# Patient Record
Sex: MALE | Race: WHITE | NOT HISPANIC OR LATINO | Employment: UNEMPLOYED | URBAN - NONMETROPOLITAN AREA
[De-identification: names, ages, dates, MRNs, and addresses within clinical notes are randomized per-mention and may not be internally consistent; named-entity substitution may affect disease eponyms.]

---

## 2021-09-14 ENCOUNTER — LAB (OUTPATIENT)
Dept: LAB | Facility: HOSPITAL | Age: 21
End: 2021-09-14

## 2021-09-14 ENCOUNTER — OFFICE VISIT (OUTPATIENT)
Dept: INTERNAL MEDICINE | Facility: CLINIC | Age: 21
End: 2021-09-14

## 2021-09-14 VITALS
SYSTOLIC BLOOD PRESSURE: 122 MMHG | BODY MASS INDEX: 24.09 KG/M2 | DIASTOLIC BLOOD PRESSURE: 70 MMHG | WEIGHT: 172.1 LBS | TEMPERATURE: 98.3 F | HEART RATE: 78 BPM | RESPIRATION RATE: 14 BRPM | OXYGEN SATURATION: 99 % | HEIGHT: 71 IN

## 2021-09-14 DIAGNOSIS — K90.0 CELIAC DISEASE: ICD-10-CM

## 2021-09-14 DIAGNOSIS — R11.2 NON-INTRACTABLE VOMITING WITH NAUSEA, UNSPECIFIED VOMITING TYPE: ICD-10-CM

## 2021-09-14 DIAGNOSIS — R53.83 FATIGUE, UNSPECIFIED TYPE: ICD-10-CM

## 2021-09-14 DIAGNOSIS — K21.9 GASTROESOPHAGEAL REFLUX DISEASE, UNSPECIFIED WHETHER ESOPHAGITIS PRESENT: ICD-10-CM

## 2021-09-14 DIAGNOSIS — R11.2 NON-INTRACTABLE VOMITING WITH NAUSEA, UNSPECIFIED VOMITING TYPE: Primary | ICD-10-CM

## 2021-09-14 DIAGNOSIS — R10.11 RUQ PAIN: ICD-10-CM

## 2021-09-14 LAB
ALBUMIN SERPL-MCNC: 5.1 G/DL (ref 3.5–5.2)
ALBUMIN/GLOB SERPL: 1.9 G/DL
ALP SERPL-CCNC: 56 U/L (ref 39–117)
ALT SERPL W P-5'-P-CCNC: 13 U/L (ref 1–41)
ANION GAP SERPL CALCULATED.3IONS-SCNC: 10 MMOL/L (ref 5–15)
AST SERPL-CCNC: 20 U/L (ref 1–40)
BASOPHILS # BLD AUTO: 0.02 10*3/MM3 (ref 0–0.2)
BASOPHILS NFR BLD AUTO: 0.3 % (ref 0–1.5)
BILIRUB SERPL-MCNC: 0.7 MG/DL (ref 0–1.2)
BUN SERPL-MCNC: 15 MG/DL (ref 6–20)
BUN/CREAT SERPL: 17 (ref 7–25)
CALCIUM SPEC-SCNC: 9.8 MG/DL (ref 8.6–10.5)
CHLORIDE SERPL-SCNC: 102 MMOL/L (ref 98–107)
CO2 SERPL-SCNC: 28 MMOL/L (ref 22–29)
CREAT SERPL-MCNC: 0.88 MG/DL (ref 0.76–1.27)
DEPRECATED RDW RBC AUTO: 40.6 FL (ref 37–54)
EOSINOPHIL # BLD AUTO: 0.09 10*3/MM3 (ref 0–0.4)
EOSINOPHIL NFR BLD AUTO: 1.3 % (ref 0.3–6.2)
ERYTHROCYTE [DISTWIDTH] IN BLOOD BY AUTOMATED COUNT: 12 % (ref 12.3–15.4)
GFR SERPL CREATININE-BSD FRML MDRD: 109 ML/MIN/1.73
GLOBULIN UR ELPH-MCNC: 2.7 GM/DL
GLUCOSE SERPL-MCNC: 96 MG/DL (ref 65–99)
HCT VFR BLD AUTO: 46.4 % (ref 37.5–51)
HGB BLD-MCNC: 15.6 G/DL (ref 13–17.7)
IMM GRANULOCYTES # BLD AUTO: 0.02 10*3/MM3 (ref 0–0.05)
IMM GRANULOCYTES NFR BLD AUTO: 0.3 % (ref 0–0.5)
LYMPHOCYTES # BLD AUTO: 2.35 10*3/MM3 (ref 0.7–3.1)
LYMPHOCYTES NFR BLD AUTO: 33.9 % (ref 19.6–45.3)
MCH RBC QN AUTO: 31 PG (ref 26.6–33)
MCHC RBC AUTO-ENTMCNC: 33.6 G/DL (ref 31.5–35.7)
MCV RBC AUTO: 92.1 FL (ref 79–97)
MONOCYTES # BLD AUTO: 0.54 10*3/MM3 (ref 0.1–0.9)
MONOCYTES NFR BLD AUTO: 7.8 % (ref 5–12)
NEUTROPHILS NFR BLD AUTO: 3.91 10*3/MM3 (ref 1.7–7)
NEUTROPHILS NFR BLD AUTO: 56.4 % (ref 42.7–76)
NRBC BLD AUTO-RTO: 0 /100 WBC (ref 0–0.2)
PLATELET # BLD AUTO: 192 10*3/MM3 (ref 140–450)
PMV BLD AUTO: 10.7 FL (ref 6–12)
POTASSIUM SERPL-SCNC: 4 MMOL/L (ref 3.5–5.2)
PROT SERPL-MCNC: 7.8 G/DL (ref 6–8.5)
RBC # BLD AUTO: 5.04 10*6/MM3 (ref 4.14–5.8)
SODIUM SERPL-SCNC: 140 MMOL/L (ref 136–145)
WBC # BLD AUTO: 6.93 10*3/MM3 (ref 3.4–10.8)

## 2021-09-14 PROCEDURE — 80050 GENERAL HEALTH PANEL: CPT

## 2021-09-14 PROCEDURE — 80061 LIPID PANEL: CPT

## 2021-09-14 PROCEDURE — 99204 OFFICE O/P NEW MOD 45 MIN: CPT | Performed by: INTERNAL MEDICINE

## 2021-09-14 PROCEDURE — 83690 ASSAY OF LIPASE: CPT

## 2021-09-14 PROCEDURE — 36415 COLL VENOUS BLD VENIPUNCTURE: CPT

## 2021-09-14 PROCEDURE — 83036 HEMOGLOBIN GLYCOSYLATED A1C: CPT

## 2021-09-14 RX ORDER — PROPRANOLOL HYDROCHLORIDE 10 MG/1
10 TABLET ORAL 3 TIMES DAILY
COMMUNITY
End: 2021-09-14

## 2021-09-14 RX ORDER — TRAZODONE HYDROCHLORIDE 50 MG/1
50 TABLET ORAL AS NEEDED
COMMUNITY
End: 2021-09-14

## 2021-09-14 RX ORDER — PANTOPRAZOLE SODIUM 40 MG/1
40 TABLET, DELAYED RELEASE ORAL DAILY
Qty: 30 TABLET | Refills: 2 | Status: SHIPPED | OUTPATIENT
Start: 2021-09-14 | End: 2022-05-12

## 2021-09-14 RX ORDER — RISPERIDONE 1 MG/1
1 TABLET ORAL 3 TIMES DAILY
COMMUNITY
End: 2021-09-14

## 2021-09-14 RX ORDER — ONDANSETRON 4 MG/1
4 TABLET, FILM COATED ORAL EVERY 8 HOURS PRN
Qty: 60 TABLET | Refills: 1 | Status: SHIPPED | OUTPATIENT
Start: 2021-09-14 | End: 2022-05-12

## 2021-09-14 NOTE — PROGRESS NOTES
Chief Complaint   Patient presents with   • Establish Care         History:  Krishna Jean-Baptiste is a 20 y.o. male who presents today for evaluation of the above problems.      He presents today to establish care.  He recently got insurance and he was having some issues.  He has known celiac disease diagnosed 5 years ago.  He is adhering to a strict gluten-free diet.  For the last 5 years he has been having issues with nausea vomiting abdominal pain.  And also feels like there is an air pocket in his stomach.  At times, pain and nausea in the morning will wake him up.  He has vomited but denies any hematemesis, melena or hematochezia.  He does have diarrhea as well.  He does smoke marijuana daily and if the pain is worse in the morning he will smoking morning.  Typically he will smoke every evening as well.  He tried coming off of marijuana without any change in his symptoms.  Heart showers do not make the symptoms better.  He had decreased appetite but paradoxically has gained weight.  He is also fatigued.  Pain is mostly in the right upper quadrant retrosternal in his stomach when he feels like There is a gas bubble.    He has tried Pepcid which did not help.  He drinks water and no soft drinks or other drinks.    He runs and exercises daily which helps.    He has never been diagnosed with IBS.    His mother became sick while he was a child and was homeless for a time.  He has been homeless off and on since the age of 13.  He would stay at a friend's homes for 6 months to a year for as long as he could stay there.  In between staying at his friend's house he would be homeless.  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  He was diagnosed with schizophrenia, personality disorder and bipolar disorder.  He was on trazodone, propranolol and Risperdal.  However, these made him feel very  sick and he stopped taking them.  He feels like he was misdiagnosed and it is now previous diagnoses    He has not had the COVID-19 vaccine but is interested.    HPI    Social History     Socioeconomic History   • Marital status: Single     Spouse name: Not on file   • Number of children: Not on file   • Years of education: Not on file   • Highest education level: Not on file   Tobacco Use   • Smoking status: Former Smoker     Packs/day: 1.50     Years: 2.00     Pack years: 3.00     Types: Cigarettes, Cigars   • Smokeless tobacco: Never Used   Substance and Sexual Activity   • Alcohol use: Never   • Drug use: Yes     Types: Marijuana   • Sexual activity: Yes     Partners: Male     Birth control/protection: Condom       ROS:  Review of Systems   Constitutional: Positive for appetite change and fatigue. Negative for unexpected weight change.   HENT: Negative.    Respiratory: Negative for cough and shortness of breath.    Cardiovascular: Negative for chest pain, palpitations and leg swelling.   Gastrointestinal: Positive for abdominal distention, abdominal pain, diarrhea, nausea and vomiting. Negative for blood in stool and constipation.        Early satiety   Genitourinary: Negative.    Musculoskeletal: Negative.    Skin: Negative.          Current Outpatient Medications:   •  ondansetron (Zofran) 4 MG tablet, Take 1 tablet by mouth Every 8 (Eight) Hours As Needed for Nausea or Vomiting., Disp: 60 tablet, Rfl: 1  •  pantoprazole (PROTONIX) 40 MG EC tablet, Take 1 tablet by mouth Daily., Disp: 30 tablet, Rfl: 2    Lab Results   Component Value Date    GLUCOSE 96 09/14/2021    BUN 15 09/14/2021    CREATININE 0.88 09/14/2021    EGFRIFNONA 109 09/14/2021    BCR 17.0 09/14/2021    K 4.0 09/14/2021    CO2 28.0 09/14/2021    CALCIUM 9.8 09/14/2021    ALBUMIN 5.10 09/14/2021    AST 20 09/14/2021    ALT 13 09/14/2021       WBC   Date Value Ref Range Status   09/14/2021 6.93 3.40 - 10.80 10*3/mm3 Final     RBC   Date Value Ref  Range Status   09/14/2021 5.04 4.14 - 5.80 10*6/mm3 Final     Hemoglobin   Date Value Ref Range Status   09/14/2021 15.6 13.0 - 17.7 g/dL Final     Hematocrit   Date Value Ref Range Status   09/14/2021 46.4 37.5 - 51.0 % Final     MCV   Date Value Ref Range Status   09/14/2021 92.1 79.0 - 97.0 fL Final     MCH   Date Value Ref Range Status   09/14/2021 31.0 26.6 - 33.0 pg Final     MCHC   Date Value Ref Range Status   09/14/2021 33.6 31.5 - 35.7 g/dL Final     RDW   Date Value Ref Range Status   09/14/2021 12.0 (L) 12.3 - 15.4 % Final     RDW-SD   Date Value Ref Range Status   09/14/2021 40.6 37.0 - 54.0 fl Final     MPV   Date Value Ref Range Status   09/14/2021 10.7 6.0 - 12.0 fL Final     Platelets   Date Value Ref Range Status   09/14/2021 192 140 - 450 10*3/mm3 Final     Neutrophil %   Date Value Ref Range Status   09/14/2021 56.4 42.7 - 76.0 % Final     Lymphocyte %   Date Value Ref Range Status   09/14/2021 33.9 19.6 - 45.3 % Final     Monocyte %   Date Value Ref Range Status   09/14/2021 7.8 5.0 - 12.0 % Final     Eosinophil %   Date Value Ref Range Status   09/14/2021 1.3 0.3 - 6.2 % Final     Basophil %   Date Value Ref Range Status   09/14/2021 0.3 0.0 - 1.5 % Final     Immature Grans %   Date Value Ref Range Status   09/14/2021 0.3 0.0 - 0.5 % Final     Neutrophils, Absolute   Date Value Ref Range Status   09/14/2021 3.91 1.70 - 7.00 10*3/mm3 Final     Lymphocytes, Absolute   Date Value Ref Range Status   09/14/2021 2.35 0.70 - 3.10 10*3/mm3 Final     Monocytes, Absolute   Date Value Ref Range Status   09/14/2021 0.54 0.10 - 0.90 10*3/mm3 Final     Eosinophils, Absolute   Date Value Ref Range Status   09/14/2021 0.09 0.00 - 0.40 10*3/mm3 Final     Basophils, Absolute   Date Value Ref Range Status   09/14/2021 0.02 0.00 - 0.20 10*3/mm3 Final     Immature Grans, Absolute   Date Value Ref Range Status   09/14/2021 0.02 0.00 - 0.05 10*3/mm3 Final     nRBC   Date Value Ref Range Status   09/14/2021 0.0 0.0 -  "0.2 /100 WBC Final         OBJECTIVE:  Visit Vitals  /70 (BP Location: Left arm, Patient Position: Sitting, Cuff Size: Adult)   Pulse 78   Temp 98.3 °F (36.8 °C) (Oral)   Resp 14   Ht 180.3 cm (71\")   Wt 78.1 kg (172 lb 1.6 oz)   SpO2 99%   BMI 24.00 kg/m²      Physical Exam  Vitals reviewed.   Constitutional:       General: He is not in acute distress.     Appearance: Normal appearance. He is well-developed and normal weight.      Comments: Pleasant   HENT:      Head: Normocephalic and atraumatic.   Eyes:      Pupils: Pupils are equal, round, and reactive to light.   Neck:      Thyroid: No thyromegaly.      Trachea: Phonation normal.   Cardiovascular:      Rate and Rhythm: Normal rate and regular rhythm.      Heart sounds: No murmur heard.     Pulmonary:      Effort: Pulmonary effort is normal. No respiratory distress.      Breath sounds: Normal breath sounds. No wheezing or rales.   Abdominal:      General: Abdomen is flat. Bowel sounds are normal. There is no distension.      Palpations: Abdomen is soft. There is no mass.      Tenderness: There is generalized abdominal tenderness and tenderness in the right upper quadrant. There is no rebound. Negative signs include Leavitt's sign.   Skin:     Coloration: Skin is not pale.      Findings: No erythema.   Neurological:      Mental Status: He is alert.   Psychiatric:         Behavior: Behavior normal.         Thought Content: Thought content normal.         Judgment: Judgment normal.         Assessment/Plan    Diagnoses and all orders for this visit:    1. Non-intractable vomiting with nausea, unspecified vomiting type (Primary)  -     Lipase; Future    2. Gastroesophageal reflux disease, unspecified whether esophagitis present  -     pantoprazole (PROTONIX) 40 MG EC tablet; Take 1 tablet by mouth Daily.  Dispense: 30 tablet; Refill: 2    3. Celiac disease    4. Fatigue, unspecified type  -     pantoprazole (PROTONIX) 40 MG EC tablet; Take 1 tablet by mouth " Daily.  Dispense: 30 tablet; Refill: 2  -     Lipid Panel; Future  -     Hemoglobin A1c; Future  -     Comprehensive Metabolic Panel; Future  -     CBC & Differential; Future  -     TSH; Future    5. RUQ pain  -     US Abdomen Limited  -     Lipase; Future    Other orders  -     ondansetron (Zofran) 4 MG tablet; Take 1 tablet by mouth Every 8 (Eight) Hours As Needed for Nausea or Vomiting.  Dispense: 60 tablet; Refill: 1      At this point unclear etiology of his symptoms.  Differential includes cholelithiasis,, gallbladder dysfunction, esophagitis or gastritis versus gastric ulcer versus reflux disease versus celiac disease, IBS, cannabis hyperemesis syndrome among others.    Needs further work-up with the lab work above.  Also ordered a ultrasound of his abdomen.  He may require a HIDA scan depending on these results and symptoms.    I would like to see how his symptoms are improved with Protonix.    It may be worth a trial of Bentyl if symptoms continue or worsen.    He does not have any red flag symptoms, but does have early satiety.  He has not lost any weight.    If the above work-up is unremarkable he may need a referral to gastroenterology for consideration of EGD.    Follow-up 3 months for recheck or sooner if indicated.    Return in about 3 months (around 12/14/2021) for Recheck.    Patient was given instructions and counseling regarding his/her condition or for health maintenance advice. Please see specific information pulled into the AVS if appropriate.     GENOVEVA Cho MD  13:46 CDT  9/14/2021

## 2021-09-15 LAB
CHOLEST SERPL-MCNC: 180 MG/DL (ref 0–200)
HBA1C MFR BLD: 5.15 % (ref 4.8–5.6)
HDLC SERPL-MCNC: 51 MG/DL (ref 40–60)
LDLC SERPL CALC-MCNC: 116 MG/DL (ref 0–100)
LDLC/HDLC SERPL: 2.26 {RATIO}
LIPASE SERPL-CCNC: 19 U/L (ref 13–60)
TRIGL SERPL-MCNC: 68 MG/DL (ref 0–150)
TSH SERPL DL<=0.05 MIU/L-ACNC: 1.05 UIU/ML (ref 0.27–4.2)
VLDLC SERPL-MCNC: 13 MG/DL (ref 5–40)

## 2022-03-02 ENCOUNTER — HOSPITAL ENCOUNTER (EMERGENCY)
Facility: HOSPITAL | Age: 22
Discharge: HOME | End: 2022-03-03
Attending: EMERGENCY MEDICINE | Admitting: EMERGENCY MEDICINE

## 2022-03-02 ENCOUNTER — NURSE TRIAGE (OUTPATIENT)
Dept: CALL CENTER | Facility: HOSPITAL | Age: 22
End: 2022-03-02

## 2022-03-02 DIAGNOSIS — R10.10 PAIN OF UPPER ABDOMEN: Primary | ICD-10-CM

## 2022-03-02 DIAGNOSIS — R07.9 CHEST PAIN, UNSPECIFIED TYPE: ICD-10-CM

## 2022-03-02 PROCEDURE — P9612 CATHETERIZE FOR URINE SPEC: HCPCS

## 2022-03-02 PROCEDURE — 99283 EMERGENCY DEPT VISIT LOW MDM: CPT

## 2022-03-02 RX ORDER — SODIUM CHLORIDE 0.9 % (FLUSH) 0.9 %
10 SYRINGE (ML) INJECTION AS NEEDED
Status: DISCONTINUED | OUTPATIENT
Start: 2022-03-02 | End: 2022-03-03 | Stop reason: HOSPADM

## 2022-03-03 VITALS
DIASTOLIC BLOOD PRESSURE: 76 MMHG | WEIGHT: 176 LBS | OXYGEN SATURATION: 98 % | TEMPERATURE: 98 F | HEART RATE: 78 BPM | HEIGHT: 71 IN | RESPIRATION RATE: 20 BRPM | BODY MASS INDEX: 24.64 KG/M2 | SYSTOLIC BLOOD PRESSURE: 118 MMHG

## 2022-03-03 LAB
ALBUMIN SERPL-MCNC: 4.6 G/DL (ref 3.5–5.2)
ALBUMIN/GLOB SERPL: 2.1 G/DL
ALP SERPL-CCNC: 52 U/L (ref 39–117)
ALT SERPL W P-5'-P-CCNC: 13 U/L (ref 1–41)
AMPHET+METHAMPHET UR QL: NEGATIVE
AMPHETAMINES UR QL: NEGATIVE
ANION GAP SERPL CALCULATED.3IONS-SCNC: 9 MMOL/L (ref 5–15)
AST SERPL-CCNC: 18 U/L (ref 1–40)
BARBITURATES UR QL SCN: NEGATIVE
BASOPHILS # BLD AUTO: 0.03 10*3/MM3 (ref 0–0.2)
BASOPHILS NFR BLD AUTO: 0.5 % (ref 0–1.5)
BENZODIAZ UR QL SCN: NEGATIVE
BILIRUB SERPL-MCNC: 0.4 MG/DL (ref 0–1.2)
BILIRUB UR QL STRIP: NEGATIVE
BUN SERPL-MCNC: 16 MG/DL (ref 6–20)
BUN/CREAT SERPL: 17.2 (ref 7–25)
BUPRENORPHINE SERPL-MCNC: NEGATIVE NG/ML
CALCIUM SPEC-SCNC: 9.2 MG/DL (ref 8.6–10.5)
CANNABINOIDS SERPL QL: POSITIVE
CHLORIDE SERPL-SCNC: 107 MMOL/L (ref 98–107)
CLARITY UR: CLEAR
CO2 SERPL-SCNC: 26 MMOL/L (ref 22–29)
COCAINE UR QL: NEGATIVE
COLOR UR: YELLOW
CREAT SERPL-MCNC: 0.93 MG/DL (ref 0.76–1.27)
DEPRECATED RDW RBC AUTO: 39.9 FL (ref 37–54)
EGFRCR SERPLBLD CKD-EPI 2021: 119.1 ML/MIN/1.73
EOSINOPHIL # BLD AUTO: 0.13 10*3/MM3 (ref 0–0.4)
EOSINOPHIL NFR BLD AUTO: 2.1 % (ref 0.3–6.2)
ERYTHROCYTE [DISTWIDTH] IN BLOOD BY AUTOMATED COUNT: 11.9 % (ref 12.3–15.4)
GLOBULIN UR ELPH-MCNC: 2.2 GM/DL
GLUCOSE SERPL-MCNC: 124 MG/DL (ref 65–99)
GLUCOSE UR STRIP-MCNC: NEGATIVE MG/DL
HCT VFR BLD AUTO: 38.9 % (ref 37.5–51)
HGB BLD-MCNC: 13.4 G/DL (ref 13–17.7)
HGB UR QL STRIP.AUTO: NEGATIVE
IMM GRANULOCYTES # BLD AUTO: 0.01 10*3/MM3 (ref 0–0.05)
IMM GRANULOCYTES NFR BLD AUTO: 0.2 % (ref 0–0.5)
KETONES UR QL STRIP: ABNORMAL
LEUKOCYTE ESTERASE UR QL STRIP.AUTO: NEGATIVE
LIPASE SERPL-CCNC: 27 U/L (ref 13–60)
LYMPHOCYTES # BLD AUTO: 2.79 10*3/MM3 (ref 0.7–3.1)
LYMPHOCYTES NFR BLD AUTO: 44.2 % (ref 19.6–45.3)
MCH RBC QN AUTO: 31.6 PG (ref 26.6–33)
MCHC RBC AUTO-ENTMCNC: 34.4 G/DL (ref 31.5–35.7)
MCV RBC AUTO: 91.7 FL (ref 79–97)
METHADONE UR QL SCN: NEGATIVE
MONOCYTES # BLD AUTO: 0.51 10*3/MM3 (ref 0.1–0.9)
MONOCYTES NFR BLD AUTO: 8.1 % (ref 5–12)
NEUTROPHILS NFR BLD AUTO: 2.84 10*3/MM3 (ref 1.7–7)
NEUTROPHILS NFR BLD AUTO: 44.9 % (ref 42.7–76)
NITRITE UR QL STRIP: NEGATIVE
NRBC BLD AUTO-RTO: 0 /100 WBC (ref 0–0.2)
OPIATES UR QL: NEGATIVE
OXYCODONE UR QL SCN: NEGATIVE
PCP UR QL SCN: NEGATIVE
PH UR STRIP.AUTO: 6.5 [PH] (ref 5–8)
PLATELET # BLD AUTO: 174 10*3/MM3 (ref 140–450)
PMV BLD AUTO: 11.1 FL (ref 6–12)
POTASSIUM SERPL-SCNC: 3.5 MMOL/L (ref 3.5–5.2)
PROPOXYPH UR QL: NEGATIVE
PROT SERPL-MCNC: 6.8 G/DL (ref 6–8.5)
PROT UR QL STRIP: NEGATIVE
QT INTERVAL: 384 MS
QTC INTERVAL: 432 MS
RBC # BLD AUTO: 4.24 10*6/MM3 (ref 4.14–5.8)
SODIUM SERPL-SCNC: 142 MMOL/L (ref 136–145)
SP GR UR STRIP: 1.02 (ref 1–1.03)
TRICYCLICS UR QL SCN: NEGATIVE
UROBILINOGEN UR QL STRIP: ABNORMAL
WBC NRBC COR # BLD: 6.31 10*3/MM3 (ref 3.4–10.8)

## 2022-03-03 PROCEDURE — 80306 DRUG TEST PRSMV INSTRMNT: CPT | Performed by: EMERGENCY MEDICINE

## 2022-03-03 PROCEDURE — 81003 URINALYSIS AUTO W/O SCOPE: CPT | Performed by: EMERGENCY MEDICINE

## 2022-03-03 PROCEDURE — 85025 COMPLETE CBC W/AUTO DIFF WBC: CPT | Performed by: EMERGENCY MEDICINE

## 2022-03-03 PROCEDURE — 93010 ELECTROCARDIOGRAM REPORT: CPT | Performed by: INTERNAL MEDICINE

## 2022-03-03 PROCEDURE — 83690 ASSAY OF LIPASE: CPT | Performed by: EMERGENCY MEDICINE

## 2022-03-03 PROCEDURE — 80053 COMPREHEN METABOLIC PANEL: CPT | Performed by: EMERGENCY MEDICINE

## 2022-03-03 PROCEDURE — 93005 ELECTROCARDIOGRAM TRACING: CPT | Performed by: EMERGENCY MEDICINE

## 2022-03-03 RX ADMIN — SODIUM CHLORIDE 1000 ML: 9 INJECTION, SOLUTION INTRAVENOUS at 00:23

## 2022-03-03 NOTE — ED PROVIDER NOTES
Subjective   Patient presents emerged part with complaint of intermittent abdominal pain since this evening around dinnertime.  Pain was epigastric and i increased his anxiety.  Called nurse helpline that advised to come to the main emergency department for evaluation.  Denies vomiting, fever, shortness of breath other than when the pain comes and then it makes him feel like he is having hard time catching a deep breath.  And patient states she was unaware that coming to emergency department would involve this amount of evaluation.  Patient otherwise been in his normal state of health up until this evening and review of systems as noted above is noncontributory.          Review of Systems   All other systems reviewed and are negative.      Past Medical History:   Diagnosis Date   • Anxiety    • Asthma    • Celiac disease        Allergies   Allergen Reactions   • Gluten Meal Unknown - Low Severity       Past Surgical History:   Procedure Laterality Date   • EXPLORATORY LAPAROTOMY     • TONSILLECTOMY         Family History   Problem Relation Age of Onset   • Cancer Mother    • Diabetes Mother    • Stroke Mother    • Alcohol abuse Father    • Alcohol abuse Paternal Grandmother        Social History     Socioeconomic History   • Marital status: Single   Tobacco Use   • Smoking status: Former Smoker     Packs/day: 1.50     Years: 2.00     Pack years: 3.00     Types: Cigarettes, Cigars   • Smokeless tobacco: Never Used   Substance and Sexual Activity   • Alcohol use: Never   • Drug use: Yes     Types: Marijuana   • Sexual activity: Yes     Partners: Male     Birth control/protection: Condom           Objective   Physical Exam  Vitals and nursing note reviewed.   Constitutional:       Appearance: Normal appearance. He is obese.   HENT:      Head: Normocephalic and atraumatic.      Nose: Nose normal.   Eyes:      General:         Right eye: No discharge.         Left eye: No discharge.      Extraocular Movements:  Extraocular movements intact.      Conjunctiva/sclera: Conjunctivae normal.   Cardiovascular:      Rate and Rhythm: Normal rate and regular rhythm.      Heart sounds: Normal heart sounds.   Pulmonary:      Effort: Pulmonary effort is normal.      Breath sounds: Normal breath sounds.   Abdominal:      General: Abdomen is flat. There is no distension.      Palpations: Abdomen is soft. There is no mass.      Tenderness: There is no abdominal tenderness. There is no guarding.   Musculoskeletal:         General: Normal range of motion.      Cervical back: Normal range of motion and neck supple.      Right lower leg: No edema.      Left lower leg: No edema.   Skin:     General: Skin is warm and dry.   Neurological:      General: No focal deficit present.      Mental Status: He is alert and oriented to person, place, and time. Mental status is at baseline.   Psychiatric:         Mood and Affect: Mood normal.         Behavior: Behavior normal.         Thought Content: Thought content normal.         Judgment: Judgment normal.         ECG 12 Lead      Date/Time: 3/3/2022 1:05 AM  Performed by: Chavo Long DO  Authorized by: Chavo Long DO   Interpreted by physician  Rhythm: sinus rhythm  Rate: normal  QRS axis: normal  ST Segments: ST segments normal  T Waves: T waves normal  Other: no other findings  Clinical impression: normal ECG  Comments: artifact                 ED Course      Patient told of test results and findings and told to follow-up with with primary care doctor as needed patient understood agrees all instructions agreed to return to emergency department for worsening or change                                           MDM  Number of Diagnoses or Management Options  Chest pain, unspecified type: minor  Pain of upper abdomen: minor     Amount and/or Complexity of Data Reviewed  Clinical lab tests: reviewed and ordered  Tests in the medicine section of CPT®: reviewed and ordered    Risk of  Complications, Morbidity, and/or Mortality  Presenting problems: low  Diagnostic procedures: low  Management options: minimal    Patient Progress  Patient progress: improved      Final diagnoses:   Pain of upper abdomen   Chest pain, unspecified type       ED Disposition  ED Disposition     ED Disposition Condition Comment    Discharge Stable           CHARLES Cho MD  [facility] [address] [place] [id number]  [phone]    Schedule an appointment as soon as possible for a visit            Medication List      No changes were made to your prescriptions during this visit.          Chavo Long,   03/03/22 0106

## 2022-03-03 NOTE — ED NOTES
"Pt comes to er from home via pov c/o chest spasm \"feeling weird\", intermittent, does not think it's a real emergency and maybe his anxiety, a&ox3, stable, no distress, non labored breathing, ambulatory, no n/v/d, occasional marijuana use, denies smoker and alcohol, takes no meds, no significant med history,      James Colón, RN  03/02/22 [id number]    "

## 2022-03-03 NOTE — TELEPHONE ENCOUNTER
"    Reason for Disposition  • Patient sounds very sick or weak to the triager    Additional Information  • Negative: Passed out (i.e., lost consciousness, collapsed and was not responding)  • Negative: Shock suspected (e.g., cold/pale/clammy skin, too weak to stand, low BP, rapid pulse)  • Negative: Difficult to awaken or acting confused (e.g., disoriented, slurred speech)  • Negative: Visible sweat on face or sweat dripping down face  • Negative: Unable to walk, or can only walk with assistance (e.g., requires support)  • Negative: [1] Received SHOCK from implantable cardiac defibrillator AND [2] persisting symptoms (i.e., palpitations, lightheadedness)  • Negative: [1] Dizziness, lightheadedness, or weakness AND [2] heart beating very rapidly (e.g., > 140 / minute)  • Negative: [1] Dizziness, lightheadedness, or weakness AND [2] heart beating very slowly  (e.g., < 50 / minute)  • Negative: Sounds like a life-threatening emergency to the triager  • Negative: Chest pain  • Negative: Implantable Cardiac Defibrillator (ICD) or a pacemaker symptoms or questions  • Negative: Difficulty breathing  • Negative: Dizziness, lightheadedness, or weakness  • Negative: [1] Heart beating very rapidly (e.g., > 140 / minute) AND [2] present now  (Exception: during exercise)  • Negative: Heart beating very slowly (e.g., < 50 / minute)  (Exception: athlete and heart rate normal for caller)  • Negative: New or worsened shortness of breath with activity (dyspnea on exertion)    Answer Assessment - Initial Assessment Questions  1. DESCRIPTION: \"Please describe your heart rate or heartbeat that you are having\" (e.g., fast/slow, regular/irregular, skipped or extra beats, \"palpitations\")      \"palpitations\"   2. ONSET: \"When did it start?\" (Minutes, hours or days)       Ab out an hour ago.    3. DURATION: \"How long does it last\" (e.g., seconds, minutes, hours)      Minutes.    4. PATTERN \"Does it come and go, or has it been constant since " "it started?\"  \"Does it get worse with exertion?\"   \"Are you feeling it now?\"      Unknown    5. TAP: \"Using your hand, can you tap out what you are feeling on a chair or table in front of you, so that I can hear?\" (Note: not all patients can do this)        No   6. HEART RATE: \"Can you tell me your heart rate?\" \"How many beats in 15 seconds?\"  (Note: not all patients can do this)        75   7. RECURRENT SYMPTOM: \"Have you ever had this before?\" If Yes, ask: \"When was the last time?\" and \"What happened that time?\"       No   8. CAUSE: \"What do you think is causing the palpitations?\"      Unknown    9. CARDIAC HISTORY: \"Do you have any history of heart disease?\" (e.g., heart attack, angina, bypass surgery, angioplasty, arrhythmia)       No   10. OTHER SYMPTOMS: \"Do you have any other symptoms?\" (e.g., dizziness, chest pain, sweating, difficulty breathing)        Becomes short of breath when this happens   11. PREGNANCY: \"Is there any chance you are pregnant?\" \"When was your last menstrual period?\"        Male    Protocols used: HEART RATE AND HEARTBEAT QUESTIONS-ADULT-AH      "

## 2022-04-01 ENCOUNTER — TELEPHONE (OUTPATIENT)
Dept: INTERNAL MEDICINE | Facility: CLINIC | Age: 22
End: 2022-04-01

## 2022-04-06 ENCOUNTER — OFFICE VISIT (OUTPATIENT)
Dept: INTERNAL MEDICINE | Facility: CLINIC | Age: 22
End: 2022-04-06

## 2022-04-06 VITALS
RESPIRATION RATE: 15 BRPM | HEART RATE: 118 BPM | BODY MASS INDEX: 23.94 KG/M2 | OXYGEN SATURATION: 98 % | DIASTOLIC BLOOD PRESSURE: 78 MMHG | HEIGHT: 71 IN | SYSTOLIC BLOOD PRESSURE: 115 MMHG | WEIGHT: 171 LBS | TEMPERATURE: 97.5 F

## 2022-04-06 DIAGNOSIS — L98.9 SCALP LESION: Primary | ICD-10-CM

## 2022-04-06 PROCEDURE — 99213 OFFICE O/P EST LOW 20 MIN: CPT | Performed by: INTERNAL MEDICINE
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
## 2022-04-18 ENCOUNTER — HOSPITAL ENCOUNTER (OUTPATIENT)
Dept: ULTRASOUND IMAGING | Facility: HOSPITAL | Age: 22
Discharge: HOME | End: 2022-04-18
Admitting: INTERNAL MEDICINE

## 2022-04-18 DIAGNOSIS — L98.9 SCALP LESION: ICD-10-CM

## 2022-04-18 PROCEDURE — 76536 US EXAM OF HEAD AND NECK: CPT

## 2022-04-19 DIAGNOSIS — L72.3 SEBACEOUS CYST: ICD-10-CM

## 2022-04-19 DIAGNOSIS — L98.9 SCALP LESION: Primary | ICD-10-CM

## 2022-04-19 NOTE — PROGRESS NOTES
Spoke with patient regarding results. Noted understanding. She stated that she has been taking her Vitamin D but she said that there has been times where she has forgotten to take it.

## 2022-05-12 PROCEDURE — 87635 SARS-COV-2 COVID-19 AMP PRB: CPT | Performed by: NURSE PRACTITIONER

## 2022-05-27 ENCOUNTER — NURSE TRIAGE (OUTPATIENT)
Dept: CALL CENTER | Facility: HOSPITAL | Age: 22
End: 2022-05-27

## 2022-05-27 ENCOUNTER — HOSPITAL ENCOUNTER (EMERGENCY)
Facility: HOSPITAL | Age: 22
Discharge: LEFT BEFORE DISCHARGE | End: 2022-05-27

## 2022-05-27 VITALS
DIASTOLIC BLOOD PRESSURE: 80 MMHG | BODY MASS INDEX: 24.11 KG/M2 | RESPIRATION RATE: 16 BRPM | HEIGHT: 72 IN | WEIGHT: 178 LBS | SYSTOLIC BLOOD PRESSURE: 124 MMHG | TEMPERATURE: 98.9 F | OXYGEN SATURATION: 100 % | HEART RATE: 75 BPM

## 2022-05-27 VITALS
SYSTOLIC BLOOD PRESSURE: 126 MMHG | RESPIRATION RATE: 22 BRPM | WEIGHT: 178 LBS | OXYGEN SATURATION: 93 % | HEIGHT: 72 IN | DIASTOLIC BLOOD PRESSURE: 85 MMHG | HEART RATE: 93 BPM | BODY MASS INDEX: 24.11 KG/M2 | TEMPERATURE: 99 F

## 2022-05-27 PROCEDURE — 99284 EMERGENCY DEPT VISIT MOD MDM: CPT

## 2022-05-27 PROCEDURE — 99211 OFF/OP EST MAY X REQ PHY/QHP: CPT

## 2022-05-27 PROCEDURE — 96375 TX/PRO/DX INJ NEW DRUG ADDON: CPT

## 2022-05-27 PROCEDURE — 96374 THER/PROPH/DIAG INJ IV PUSH: CPT

## 2022-05-28 ENCOUNTER — HOSPITAL ENCOUNTER (EMERGENCY)
Age: 22
Discharge: HOME | End: 2022-05-28
Attending: EMERGENCY MEDICINE
Payer: MEDICAID

## 2022-05-28 DIAGNOSIS — Z98.818 S/P WISDOM TOOTH EXTRACTION: ICD-10-CM

## 2022-05-28 DIAGNOSIS — R11.2 NON-INTRACTABLE VOMITING WITH NAUSEA, UNSPECIFIED VOMITING TYPE: Primary | ICD-10-CM

## 2022-05-28 LAB
ALBUMIN SERPL-MCNC: 5.2 G/DL (ref 3.5–5.2)
ALP BLD-CCNC: 62 U/L (ref 40–130)
ALT SERPL-CCNC: 22 U/L (ref 5–41)
ANION GAP SERPL CALCULATED.3IONS-SCNC: 15 MMOL/L (ref 7–19)
AST SERPL-CCNC: 22 U/L (ref 5–40)
BASOPHILS ABSOLUTE: 0 K/UL (ref 0–0.2)
BASOPHILS RELATIVE PERCENT: 0.1 % (ref 0–1)
BILIRUB SERPL-MCNC: 0.7 MG/DL (ref 0.2–1.2)
BILIRUBIN URINE: NEGATIVE
BLOOD, URINE: NEGATIVE
BUN BLDV-MCNC: 15 MG/DL (ref 6–20)
CALCIUM SERPL-MCNC: 10.3 MG/DL (ref 8.6–10)
CHLORIDE BLD-SCNC: 101 MMOL/L (ref 98–111)
CLARITY: CLEAR
CO2: 19 MMOL/L (ref 22–29)
COLOR: YELLOW
CREAT SERPL-MCNC: 0.9 MG/DL (ref 0.5–1.2)
EOSINOPHILS ABSOLUTE: 0 K/UL (ref 0–0.6)
EOSINOPHILS RELATIVE PERCENT: 0 % (ref 0–5)
GFR AFRICAN AMERICAN: >59
GFR NON-AFRICAN AMERICAN: >60
GLUCOSE BLD-MCNC: 154 MG/DL (ref 74–109)
GLUCOSE URINE: NEGATIVE MG/DL
HCT VFR BLD CALC: 44.6 % (ref 42–52)
HEMOGLOBIN: 15.5 G/DL (ref 14–18)
IMMATURE GRANULOCYTES #: 0.1 K/UL
KETONES, URINE: 40 MG/DL
LEUKOCYTE ESTERASE, URINE: NEGATIVE
LIPASE: 29 U/L (ref 13–60)
LYMPHOCYTES ABSOLUTE: 1.2 K/UL (ref 1.1–4.5)
LYMPHOCYTES RELATIVE PERCENT: 6.5 % (ref 20–40)
MCH RBC QN AUTO: 31.3 PG (ref 27–31)
MCHC RBC AUTO-ENTMCNC: 34.8 G/DL (ref 33–37)
MCV RBC AUTO: 90.1 FL (ref 80–94)
MONOCYTES ABSOLUTE: 1.1 K/UL (ref 0–0.9)
MONOCYTES RELATIVE PERCENT: 6.1 % (ref 0–10)
NEUTROPHILS ABSOLUTE: 15.9 K/UL (ref 1.5–7.5)
NEUTROPHILS RELATIVE PERCENT: 86.8 % (ref 50–65)
NITRITE, URINE: NEGATIVE
PDW BLD-RTO: 11.9 % (ref 11.5–14.5)
PH UA: 6.5 (ref 5–8)
PLATELET # BLD: 244 K/UL (ref 130–400)
PMV BLD AUTO: 11.3 FL (ref 9.4–12.4)
POTASSIUM REFLEX MAGNESIUM: 3.6 MMOL/L (ref 3.5–5)
PROTEIN UA: NEGATIVE MG/DL
RBC # BLD: 4.95 M/UL (ref 4.7–6.1)
SODIUM BLD-SCNC: 135 MMOL/L (ref 136–145)
SPECIFIC GRAVITY UA: 1.01 (ref 1–1.03)
TOTAL PROTEIN: 8.1 G/DL (ref 6.6–8.7)
UROBILINOGEN, URINE: 0.2 E.U./DL
WBC # BLD: 18.3 K/UL (ref 4.8–10.8)

## 2022-05-28 PROCEDURE — 96374 THER/PROPH/DIAG INJ IV PUSH: CPT

## 2022-05-28 PROCEDURE — 83690 ASSAY OF LIPASE: CPT

## 2022-05-28 PROCEDURE — 96375 TX/PRO/DX INJ NEW DRUG ADDON: CPT

## 2022-05-28 PROCEDURE — 80053 COMPREHEN METABOLIC PANEL: CPT

## 2022-05-28 PROCEDURE — 2580000003 HC RX 258: Performed by: EMERGENCY MEDICINE

## 2022-05-28 PROCEDURE — 85025 COMPLETE CBC W/AUTO DIFF WBC: CPT

## 2022-05-28 PROCEDURE — 81003 URINALYSIS AUTO W/O SCOPE: CPT

## 2022-05-28 PROCEDURE — 36415 COLL VENOUS BLD VENIPUNCTURE: CPT

## 2022-05-28 PROCEDURE — 6360000002 HC RX W HCPCS: Performed by: EMERGENCY MEDICINE

## 2022-05-28 RX ORDER — ONDANSETRON 2 MG/ML
4 INJECTION INTRAMUSCULAR; INTRAVENOUS ONCE
Status: COMPLETED | OUTPATIENT
Start: 2022-05-28 | End: 2022-05-28

## 2022-05-28 RX ORDER — ONDANSETRON 4 MG/1
4 TABLET, ORALLY DISINTEGRATING ORAL EVERY 8 HOURS PRN
Qty: 20 TABLET | Refills: 0 | Status: SHIPPED | OUTPATIENT
Start: 2022-05-28

## 2022-05-28 RX ORDER — METOCLOPRAMIDE HYDROCHLORIDE 5 MG/ML
10 INJECTION INTRAMUSCULAR; INTRAVENOUS ONCE
Status: COMPLETED | OUTPATIENT
Start: 2022-05-28 | End: 2022-05-28

## 2022-05-28 RX ORDER — PROMETHAZINE HYDROCHLORIDE 12.5 MG/1
12.5 TABLET ORAL 3 TIMES DAILY PRN
Qty: 12 TABLET | Refills: 0 | Status: SHIPPED | OUTPATIENT
Start: 2022-05-28 | End: 2022-06-04

## 2022-05-28 RX ORDER — SODIUM CHLORIDE, SODIUM LACTATE, POTASSIUM CHLORIDE, AND CALCIUM CHLORIDE .6; .31; .03; .02 G/100ML; G/100ML; G/100ML; G/100ML
1000 INJECTION, SOLUTION INTRAVENOUS ONCE
Status: COMPLETED | OUTPATIENT
Start: 2022-05-28 | End: 2022-05-28

## 2022-05-28 RX ADMIN — METOCLOPRAMIDE 10 MG: 5 INJECTION, SOLUTION INTRAMUSCULAR; INTRAVENOUS at 01:16

## 2022-05-28 RX ADMIN — SODIUM CHLORIDE, POTASSIUM CHLORIDE, SODIUM LACTATE AND CALCIUM CHLORIDE 1000 ML: 600; 310; 30; 20 INJECTION, SOLUTION INTRAVENOUS at 01:14

## 2022-05-28 RX ADMIN — ONDANSETRON 4 MG: 2 INJECTION INTRAMUSCULAR; INTRAVENOUS at 01:14

## 2022-05-28 ASSESSMENT — ENCOUNTER SYMPTOMS
VOMITING: 1
EYE REDNESS: 0
SHORTNESS OF BREATH: 0
EYE PAIN: 0
DIARRHEA: 0
VOICE CHANGE: 0
RHINORRHEA: 0
COUGH: 0
ABDOMINAL PAIN: 0
NAUSEA: 1

## 2022-05-28 NOTE — ED PROVIDER NOTES
[facility] EMERGENCY DEPT  EMERGENCY DEPARTMENT ENCOUNTER      Pt Name: Elizabeth Jerez  MRN: [id number]  [place] 2000  Date of evaluation: 5/27/2022  Provider: Alin Yoo MD    [address] [place] [address]       Chief Complaint   Patient presents with    Emesis     Pt had wisdom teeth removed 5/26. States he has been consistently throwing up and hasnt urinated in 12 hours    Fatigue         HISTORY OF PRESENT ILLNESS   (Location/Symptom, Timing/Onset,Context/Setting, Quality, Duration, Modifying Factors, Severity)  Note limiting factors. Elizabeth Jerez is a 24 y.o. male who presents to the emergency department for evaluation after having persistent nausea and vomiting throughout the day. Had sedation for wisdom tooth extraction earlier today. States the nausea and vomiting were present immediately after waking up from sedation earlier. Did not have any medication to take at home for nausea. Took a percocet earlier today but felt worse after that. No abdominal pain, nausea, vomiting, fever. Has had prior unspecified abdominal surgery, sounds like exploratory     HPI    NursingNotes were reviewed. REVIEW OF SYSTEMS    (2-9 systems for level 4, 10 or more for level 5)     Review of Systems   Constitutional: Positive for chills and fatigue. Negative for fever. HENT: Negative for congestion, rhinorrhea and voice change. Eyes: Negative for pain and redness. Respiratory: Negative for cough and shortness of breath. Cardiovascular: Negative for chest pain. Gastrointestinal: Positive for nausea and vomiting. Negative for abdominal pain and diarrhea. Endocrine: Negative. Genitourinary: Negative. Musculoskeletal: Negative for arthralgias and gait problem. Skin: Negative for rash and wound. Neurological: Negative for weakness and headaches. Hematological: Negative. Psychiatric/Behavioral: Negative. All other systems reviewed and are negative.       A complete review of systems was performed and is negative except as noted above in the HPI. PAST MEDICAL HISTORY     Past Medical History:   Diagnosis Date    PTSD (post-traumatic stress disorder)          SURGICAL HISTORY       Past Surgical History:   Procedure Laterality Date    ABDOMINAL EXPLORATION SURGERY      TONSILLECTOMY      WISDOM TOOTH EXTRACTION           CURRENT MEDICATIONS       Discharge Medication List as of 5/28/2022  2:15 AM          ALLERGIES     Gluten    FAMILY HISTORY     History reviewed. No pertinent family history. SOCIAL HISTORY       Social History     Socioeconomic History    Marital status: Single     Spouse name: None    Number of children: None    Years of education: None    Highest education level: None   Occupational History    None   Tobacco Use    Smoking status: Former Smoker    Smokeless tobacco: Never Used   Substance and Sexual Activity    Alcohol use: Never    Drug use: Yes     Types: Marijuana Monica Beat)    Sexual activity: None   Other Topics Concern    None   Social History Narrative    None     Social Determinants of Health     Financial Resource Strain:     Difficulty of Paying Living Expenses: Not on file   Food Insecurity:     Worried About Running Out of Food in the Last Year: Not on file    Abhijit of Food in the Last Year: Not on file   Transportation Needs:     Lack of Transportation (Medical): Not on file    Lack of Transportation (Non-Medical):  Not on file   Physical Activity:     Days of Exercise per Week: Not on file    Minutes of Exercise per Session: Not on file   Stress:     Feeling of Stress : Not on file   Social Connections:     Frequency of Communication with Friends and Family: Not on file    Frequency of Social Gatherings with Friends and Family: Not on file    Attends Catholic Services: Not on file    Active Member of Clubs or Organizations: Not on file    Attends Club or Organization Meetings: Not on file    Marital Status: Not on file   Intimate Partner Violence:     Fear of Current or Ex-Partner: Not on file    Emotionally Abused: Not on file    Physically Abused: Not on file    Sexually Abused: Not on file   Housing Stability:     Unable to Pay for Housing in the Last Year: Not on file    Number of Places Lived in the Last Year: Not on file    Unstable Housing in the Last Year: Not on file       SCREENINGS    Rives Coma Scale  Eye Opening: Spontaneous  Best Verbal Response: Oriented  Best Motor Response: Obeys commands  Rives Coma Scale Score: 15        PHYSICAL EXAM    (up to 7 for level 4, 8 or more for level 5)     ED Triage Vitals [05/27/22 2356]   BP Temp Temp Source Heart Rate Resp SpO2 Height Weight   126/85 99 °F (37.2 °C) Temporal 93 22 93 % 6' (1.829 m) 178 lb (80.7 kg)       Physical Exam  Vitals and nursing note reviewed. Constitutional:       General: He is not in acute distress. Appearance: Normal appearance. He is well-developed. He is not diaphoretic. HENT:      Head: Normocephalic and atraumatic. Mouth/Throat:      Pharynx: No oropharyngeal exudate. Eyes:      General: No scleral icterus. Pupils: Pupils are equal, round, and reactive to light. Neck:      Trachea: No tracheal deviation. Cardiovascular:      Rate and Rhythm: Normal rate. Pulses: Normal pulses. Heart sounds: Normal heart sounds. Pulmonary:      Effort: Pulmonary effort is normal.      Breath sounds: Normal breath sounds. No stridor. No wheezing or rhonchi. Abdominal:      General: There is no distension. Palpations: Abdomen is soft. Abdomen is not rigid. Tenderness: There is no abdominal tenderness. There is no guarding. Hernia: No hernia is present. Musculoskeletal:         General: No deformity. Cervical back: Normal range of motion. Skin:     General: Skin is warm and dry. Findings: No rash. Neurological:      Mental Status: He is alert and oriented to person, place, and time. Cranial Nerves:  No cranial nerve deficit. Coordination: Coordination normal.   Psychiatric:         Behavior: Behavior normal.         DIAGNOSTIC RESULTS     EKG: All EKG's are interpreted by the Emergency Department Physician who either signs or Co-signs this chart in the absence of a cardiologist.        RADIOLOGY:   Non-plain film images such as CT, Ultrasound and MRI are read by the radiologist. Zollie Buckle images are visualized and preliminarily interpreted by the emergency physician with the below findings:        Interpretation per the Radiologist below, if available at the time of this note:    No orders to display         ED BEDSIDE ULTRASOUND:   Performed by ED Physician - none    LABS:  Labs Reviewed   CBC WITH AUTO DIFFERENTIAL - Abnormal; Notable for the following components:       Result Value    WBC 18.3 (*)     MCH 31.3 (*)     Neutrophils % 86.8 (*)     Lymphocytes % 6.5 (*)     Neutrophils Absolute 15.9 (*)     Monocytes Absolute 1.10 (*)     All other components within normal limits   COMPREHENSIVE METABOLIC PANEL W/ REFLEX TO MG FOR LOW K - Abnormal; Notable for the following components:    Sodium 135 (*)     CO2 19 (*)     Glucose 154 (*)     Calcium 10.3 (*)     All other components within normal limits   URINALYSIS WITH REFLEX TO CULTURE - Abnormal; Notable for the following components:    Ketones, Urine 40 (*)     All other components within normal limits   LIPASE       All other labs were within normal range or not returned as of this dictation. EMERGENCY DEPARTMENT COURSE and DIFFERENTIALDIAGNOSIS/MDM:   Vitals:    Vitals:    05/27/22 2356   BP: 126/85   Pulse: 93   Resp: 22   Temp: 99 °F (37.2 °C)   TempSrc: Temporal   SpO2: 93%   Weight: 178 lb (80.7 kg)   Height: 6' (1.829 m)       MDM     sxs improved after treatment. Pt tolerating oral intake. [place] stable for discharge with antiemetics.        Evaluation and work-up here revealed no signs of emergent or life-threatening pathology that would necessitate admission for further work-up or management at this time. Patient is felt to be stable for discharge home with return precautions for worsening of the condition or development of new concerning symptoms. Patient was encouraged to follow-up with their primary care doctor in the appropriate timeframe. Necessary prescriptions and information have been provided for treatment at home. Patient voices understanding and agreement with the plan. \    CONSULTS:  None    PROCEDURES:  Unless otherwise notedbelow, none     Procedures    FINAL IMPRESSION     1. Non-intractable vomiting with nausea, unspecified vomiting type    2. S/P wisdom tooth extraction          DISPOSITION/PLAN   DISPOSITION Decision To Discharge 05/28/2022 01:28:59 AM      No notes of EC Admission Criteria type on file.     PATIENT REFERRED TO:  [facility] EMERGENCY DEPT  [facility]  [phone]    If symptoms worsen    Kaylin Bradley MD  [address] [phone]      As needed      DISCHARGE MEDICATIONS:  Discharge Medication List as of 5/28/2022  2:15 AM      START taking these medications    Details   ondansetron (ZOFRAN ODT) 4 MG disintegrating tablet Take 1 tablet by mouth every 8 hours as needed for Nausea or Vomiting, Disp-20 tablet, R-0Normal      promethazine (PHENERGAN) 12.5 mg tablet Take 1 tablet by mouth 3 times daily as needed for Nausea, Disp-12 tablet, R-0Normal                (Please note that portions of this note were completed with a voice recognition program.  Efforts were made to edit the dictations butoccasionally words are mis-transcribed.)    Sosa Soares MD (electronically signed)  Emergency Physician          Sosa Aldana MD  05/28/22 [id number]

## 2022-05-28 NOTE — TELEPHONE ENCOUNTER
"Caller states had wisdom teeth extracted at noon and vomiting four times now. Caller states last urination before procedure. Caller states can't keep water down. Caller states some bleeding on gauze but denies large amount or swallowing blood. Caller denies fever and reports mild swelling only. Caller is going to attempt to contact oral surgeon now and if not able will go to ER for evaluation.     Reason for Disposition  • [1] Caller has URGENT question AND [2] triager unable to answer question    Additional Information  • Negative: Sounds like a life-threatening emergency to the triager  • Negative: Tooth knocked out  • Negative: [1] Bleeding present > 30 minutes AND [2] using correct technique of direct pressure  • Negative: [1] Bleeding now AND [2] second call after being instructed in correct technique of direct pressure  • Negative: [1] Has packing sutured over socket (extraction site) AND [2] now bleeding around the packing (Exception: few drops or ooze)  • Negative: Tongue is very swollen and tender  • Negative: [1] Face is swollen AND [2] fever  • Negative: Patient sounds very sick or weak to the triager  • Negative: [1] SEVERE pain (e.g., excruciating, unable to do any normal activities) AND [2] not improved 2 hours after pain medicine  • Negative: Face is very swollen  • Negative: Fever    Answer Assessment - Initial Assessment Questions  1. SYMPTOM: \"What's the main symptom you're concerned about?\" (e.g., bleeding, pain, fever)      Vomiting about four times   2. ONSET: \"When did the vomiting start?\"      Around noon all four extracted started vomiting right after   3. DATE of TOOTH EXTRACTION: \"When was surgery performed?\"       All four wisdom teeth pulled   4. BLEEDING: \"Is there any bleeding?\" If Yes, ask: \"How much?\"         No fever   5. PAIN: \"Is there any pain?\" If Yes, ask: \"How bad is it?\"  (Scale 1-10; or mild, moderate, severe)       Rates pain five   6. FEVER: \"Do you have a fever?\" If Yes, " "ask: \"What is your temperature, how was it measured, and when did it start?\"      Denies   7. OTHER SYMPTOMS: \"Do you have any other symptoms?\" (e.g., face swelling)      Last time urinated this morning.    Protocols used: TOOTH EXTRACTION-ADULT-AH      "

## 2022-07-15 ENCOUNTER — OFFICE VISIT (OUTPATIENT)
Dept: INTERNAL MEDICINE | Facility: CLINIC | Age: 22
End: 2022-07-15

## 2022-07-15 ENCOUNTER — LAB (OUTPATIENT)
Dept: LAB | Facility: HOSPITAL | Age: 22
End: 2022-07-15

## 2022-07-15 VITALS
DIASTOLIC BLOOD PRESSURE: 80 MMHG | RESPIRATION RATE: 15 BRPM | OXYGEN SATURATION: 99 % | WEIGHT: 180 LBS | HEART RATE: 78 BPM | BODY MASS INDEX: 25.2 KG/M2 | SYSTOLIC BLOOD PRESSURE: 120 MMHG | TEMPERATURE: 98.5 F | HEIGHT: 71 IN

## 2022-07-15 DIAGNOSIS — F64.9 GENDER DYSPHORIA: ICD-10-CM

## 2022-07-15 DIAGNOSIS — F64.9 GENDER DYSPHORIA: Primary | ICD-10-CM

## 2022-07-15 LAB
ALBUMIN SERPL-MCNC: 5 G/DL (ref 3.5–5.2)
ALBUMIN/GLOB SERPL: 2.1 G/DL
ALP SERPL-CCNC: 55 U/L (ref 39–117)
ALT SERPL W P-5'-P-CCNC: 16 U/L (ref 1–41)
ANION GAP SERPL CALCULATED.3IONS-SCNC: 12 MMOL/L (ref 5–15)
AST SERPL-CCNC: 22 U/L (ref 1–40)
BILIRUB SERPL-MCNC: 0.5 MG/DL (ref 0–1.2)
BUN SERPL-MCNC: 13 MG/DL (ref 6–20)
BUN/CREAT SERPL: 12.9 (ref 7–25)
CALCIUM SPEC-SCNC: 9.6 MG/DL (ref 8.6–10.5)
CHLORIDE SERPL-SCNC: 102 MMOL/L (ref 98–107)
CO2 SERPL-SCNC: 25 MMOL/L (ref 22–29)
CREAT SERPL-MCNC: 1.01 MG/DL (ref 0.76–1.27)
DEPRECATED RDW RBC AUTO: 40.9 FL (ref 37–54)
EGFRCR SERPLBLD CKD-EPI 2021: 107.8 ML/MIN/1.73
ERYTHROCYTE [DISTWIDTH] IN BLOOD BY AUTOMATED COUNT: 12.1 % (ref 12.3–15.4)
ESTRADIOL SERPL HS-MCNC: 20.5 PG/ML
GLOBULIN UR ELPH-MCNC: 2.4 GM/DL
GLUCOSE SERPL-MCNC: 97 MG/DL (ref 65–99)
HCT VFR BLD AUTO: 45.8 % (ref 37.5–51)
HGB BLD-MCNC: 15.6 G/DL (ref 13–17.7)
MCH RBC QN AUTO: 31.4 PG (ref 26.6–33)
MCHC RBC AUTO-ENTMCNC: 34.1 G/DL (ref 31.5–35.7)
MCV RBC AUTO: 92.2 FL (ref 79–97)
PLATELET # BLD AUTO: 185 10*3/MM3 (ref 140–450)
PMV BLD AUTO: 11.4 FL (ref 6–12)
POTASSIUM SERPL-SCNC: 3.9 MMOL/L (ref 3.5–5.2)
PROT SERPL-MCNC: 7.4 G/DL (ref 6–8.5)
RBC # BLD AUTO: 4.97 10*6/MM3 (ref 4.14–5.8)
SODIUM SERPL-SCNC: 139 MMOL/L (ref 136–145)
TESTOST SERPL-MCNC: 557 NG/DL (ref 249–836)
WBC NRBC COR # BLD: 5.89 10*3/MM3 (ref 3.4–10.8)

## 2022-07-15 PROCEDURE — 82670 ASSAY OF TOTAL ESTRADIOL: CPT

## 2022-07-15 PROCEDURE — 36415 COLL VENOUS BLD VENIPUNCTURE: CPT

## 2022-07-15 PROCEDURE — 99213 OFFICE O/P EST LOW 20 MIN: CPT | Performed by: INTERNAL MEDICINE

## 2022-07-15 PROCEDURE — 80053 COMPREHEN METABOLIC PANEL: CPT

## 2022-07-15 PROCEDURE — 85027 COMPLETE CBC AUTOMATED: CPT

## 2022-07-15 PROCEDURE — 84403 ASSAY OF TOTAL TESTOSTERONE: CPT

## 2022-07-15 NOTE — PROGRESS NOTES
Chief Complaint   Patient presents with   • Office Visit   • Hormone Therapy         History:  Krishna Jean-Baptiste is a 21 y.o. male who presents today for evaluation of the above problems.      She presents today to discuss hormone replacement.  She wants to discuss pathway to start feeling better, and feeling comfortable in her own skin.  She is a male to female transgender person.  She is noted that she has been feeling well since the age of 3 when she realized that she was a person.  She was wearing dresses as a young child but stopped because her family wanted her to.  She has followed with mental health for anxiety issues, but has not talked about transitioning previously.  Mentally, she is not a very good space right now has been consistent in her transgender status.    She is planning on keeping her current name for now    She has done research on the renal replacement therapy.  The risks have made her somewhat uncomfortable, but accepts these risks.    HPI     ROS:  Review of Systems     As above        Current Outpatient Medications:   •  acetaminophen (TYLENOL) 500 MG tablet, Take 500 mg by mouth Every 6 (Six) Hours As Needed for Mild Pain ., Disp: , Rfl:   •  albuterol sulfate  (90 Base) MCG/ACT inhaler, Inhale 2 puffs Every 6 (Six) Hours As Needed for Wheezing or Shortness of Air., Disp: 6.7 g, Rfl: 0  •  Dextromethorphan HBr (Vicks DayQuil Cough) 15 MG/15ML liquid, Take  by mouth., Disp: , Rfl:     Lab Results   Component Value Date    GLUCOSE 124 (H) 03/03/2022    BUN 16 03/03/2022    CREATININE 0.93 03/03/2022    EGFRIFNONA 109 09/14/2021    BCR 17.2 03/03/2022    K 3.5 03/03/2022    CO2 26.0 03/03/2022    CALCIUM 9.2 03/03/2022    ALBUMIN 4.60 03/03/2022    AST 18 03/03/2022    ALT 13 03/03/2022       WBC   Date Value Ref Range Status   05/28/2022 18.3 (H) 4.8 - 10.8 K/uL Final     RBC   Date Value Ref Range Status   05/28/2022 4.95 4.70 - 6.10 M/uL Final     Hemoglobin   Date Value Ref Range  Status   05/28/2022 15.5 14.0 - 18.0 g/dL Final     Hematocrit   Date Value Ref Range Status   05/28/2022 44.6 42.0 - 52.0 % Final     MCV   Date Value Ref Range Status   05/28/2022 90.1 80.0 - 94.0 fL Final     MCH   Date Value Ref Range Status   05/28/2022 31.3 (H) 27.0 - 31.0 pg Final     MCHC   Date Value Ref Range Status   05/28/2022 34.8 33.0 - 37.0 g/dL Final     RDW   Date Value Ref Range Status   05/28/2022 11.9 11.5 - 14.5 % Final     RDW-SD   Date Value Ref Range Status   03/03/2022 39.9 37.0 - 54.0 fl Final     MPV   Date Value Ref Range Status   05/28/2022 11.3 9.4 - 12.4 fL Final     Platelets   Date Value Ref Range Status   05/28/2022 244 130 - 400 K/uL Final     Neutrophil Rel %   Date Value Ref Range Status   05/28/2022 86.8 (H) 50.0 - 65.0 % Final     Lymphocyte Rel %   Date Value Ref Range Status   05/28/2022 6.5 (L) 20.0 - 40.0 % Final     Monocyte Rel %   Date Value Ref Range Status   05/28/2022 6.1 0.0 - 10.0 % Final     Eosinophil Rel %   Date Value Ref Range Status   05/28/2022 0 0.0 - 5.0 % Final     Basophil Rel %   Date Value Ref Range Status   05/28/2022 0.1 0.0 - 1.0 % Final     Immature Grans %   Date Value Ref Range Status   03/03/2022 0.2 0.0 - 0.5 % Final     Neutrophils Absolute   Date Value Ref Range Status   05/28/2022 15.9 (H) 1.5 - 7.5 K/uL Final     Lymphocytes Absolute   Date Value Ref Range Status   05/28/2022 1.2 1.1 - 4.5 K/uL Final     Monocytes Absolute   Date Value Ref Range Status   05/28/2022 1.10 (H) 0.00 - 0.90 K/uL Final     Eosinophils Absolute   Date Value Ref Range Status   05/28/2022 0.00 0.00 - 0.60 K/uL Final     Basophils Absolute   Date Value Ref Range Status   05/28/2022 0.00 0.00 - 0.20 K/uL Final     Immature Grans, Absolute   Date Value Ref Range Status   05/28/2022 0.1 K/uL K/uL Final     nRBC   Date Value Ref Range Status   03/03/2022 0.0 0.0 - 0.2 /100 WBC Final         OBJECTIVE:  Visit Vitals  /80 (BP Location: Left arm, Patient Position:  "Sitting, Cuff Size: Adult)   Pulse 78   Temp 98.5 °F (36.9 °C) (Oral)   Resp 15   Ht 180.3 cm (70.98\")   Wt 81.6 kg (180 lb)   SpO2 99%   BMI 25.12 kg/m²      Physical Exam  Vitals and nursing note reviewed.   Constitutional:       General: He is not in acute distress.     Appearance: He is well-developed. He is not ill-appearing or toxic-appearing.      Comments: Pleasant.  Accompanied by her significant other   HENT:      Head: Normocephalic and atraumatic.   Eyes:      Pupils: Pupils are equal, round, and reactive to light.   Neck:      Thyroid: No thyromegaly.      Trachea: Phonation normal.   Pulmonary:      Effort: No respiratory distress.   Skin:     Coloration: Skin is not pale.      Findings: No erythema.   Neurological:      Mental Status: He is alert.   Psychiatric:         Behavior: Behavior normal.         Thought Content: Thought content normal.         Judgment: Judgment normal.         Assessment/Plan    Diagnoses and all orders for this visit:    1. Gender dysphoria (Primary)  -     Ambulatory Referral to Psychiatry  -     Testosterone; Future  -     Estradiol; Future  -     CBC (No Diff); Future  -     Comprehensive metabolic panel; Future        Would like to get some baseline labs today.  After she gets evaluated by psychiatry we will see her back in the office to discuss treatment options.    We could start estradiol cypionate 2 mg IM every week, and Aldactone low-dose initially and up titration up to 100 to 300 mg daily.    Follow-up after she discusses with Dr. Meadows    No follow-ups on file.      GENOVEVA Cho MD  09:17 CDT  7/15/2022   "

## 2022-08-05 ENCOUNTER — TELEPHONE (OUTPATIENT)
Dept: INTERNAL MEDICINE | Facility: CLINIC | Age: 22
End: 2022-08-05

## 2022-08-05 NOTE — TELEPHONE ENCOUNTER
Attempted to contact patient. No answer. Unable to leave a VM. I sent the referral personally. If the patient does not answer office throws out the referral. The patient can call Dr. Meadows's office and check on appointment. Her number is [phone]

## 2022-08-05 NOTE — TELEPHONE ENCOUNTER
Caller: Krishna Jean-Baptiste    Relationship: Self    Best call back number:[phone]    What is the best time to reach you: ANYTIME    Who are you requesting to speak with (clinical staff, provider,  specific staff member): CLINICAL      What was the call regarding: THERAPIST THAT YOU REFERRED HIM TO HAS NEVER CALLED    Do you require a callback: YES

## 2022-08-06 ENCOUNTER — HOSPITAL ENCOUNTER (OUTPATIENT)
Dept: GENERAL RADIOLOGY | Facility: HOSPITAL | Age: 22
Discharge: HOME | End: 2022-08-06

## 2022-08-06 DIAGNOSIS — T14.90XA INJURY: ICD-10-CM

## 2022-08-06 PROCEDURE — 73080 X-RAY EXAM OF ELBOW: CPT

## 2022-08-06 PROCEDURE — 73660 X-RAY EXAM OF TOE(S): CPT

## 2022-08-08 ENCOUNTER — OFFICE VISIT (OUTPATIENT)
Dept: INTERNAL MEDICINE | Facility: CLINIC | Age: 22
End: 2022-08-08

## 2022-08-08 VITALS
SYSTOLIC BLOOD PRESSURE: 135 MMHG | BODY MASS INDEX: 25.2 KG/M2 | WEIGHT: 180 LBS | DIASTOLIC BLOOD PRESSURE: 80 MMHG | OXYGEN SATURATION: 99 % | TEMPERATURE: 98.2 F | HEIGHT: 71 IN | HEART RATE: 107 BPM | RESPIRATION RATE: 15 BRPM

## 2022-08-08 DIAGNOSIS — Z72.52 HIGH RISK HOMOSEXUAL BEHAVIOR: ICD-10-CM

## 2022-08-08 DIAGNOSIS — Z11.59 NEED FOR HEPATITIS C SCREENING TEST: ICD-10-CM

## 2022-08-08 DIAGNOSIS — F64.9 GENDER DYSPHORIA: Primary | ICD-10-CM

## 2022-08-08 PROCEDURE — 99214 OFFICE O/P EST MOD 30 MIN: CPT | Performed by: INTERNAL MEDICINE

## 2022-08-08 RX ORDER — ESTRADIOL VALERATE 10 MG/ML
5 INJECTION INTRAMUSCULAR
Qty: 10 ML | Refills: 2 | Status: SHIPPED | OUTPATIENT
Start: 2022-08-08 | End: 2022-09-07 | Stop reason: SDUPTHER

## 2022-08-08 RX ORDER — SPIRONOLACTONE 25 MG/1
25 TABLET ORAL DAILY
Qty: 30 TABLET | Refills: 1 | Status: SHIPPED | OUTPATIENT
Start: 2022-08-08 | End: 2022-09-07 | Stop reason: SDUPTHER

## 2022-08-08 NOTE — PATIENT INSTRUCTIONS
-keep an eye on your blood pressure and let me know if it drops <100  -we will get labs in 2 weeks

## 2022-08-08 NOTE — PROGRESS NOTES
Chief Complaint   Patient presents with   • Follow-up     Labs           History:  Krishna Jean-Baptiste is a 21 y.o. male who presents today for evaluation of the above problems.      She presents today for follow-up.  She was unable to get in with Dr. Meadows.  She believes she will be moving to [facility] in the next month or so.  She has been in contact with 2 different therapist who specializes in LGBTQ and transgender.     She is interested in starting hormone therapy, and getting yearly STI labs    HPI      ROS:  Review of Systems  As above    Current Outpatient Medications:   •  albuterol sulfate  (90 Base) MCG/ACT inhaler, Inhale 2 puffs Every 6 (Six) Hours As Needed for Wheezing or Shortness of Air., Disp: 6.7 g, Rfl: 0  •  Dextromethorphan HBr (Vicks DayQuil Cough) 15 MG/15ML liquid, Take  by mouth., Disp: , Rfl:   •  Estradiol Valerate 10 MG/ML oil, Inject 5 mg into the appropriate muscle as directed by prescriber Every 14 (Fourteen) Days., Disp: 10 mL, Rfl: 2  •  spironolactone (Aldactone) 25 MG tablet, Take 1 tablet by mouth Daily., Disp: 30 tablet, Rfl: 1    Lab Results   Component Value Date    GLUCOSE 97 07/15/2022    BUN 13 07/15/2022    CREATININE 1.01 07/15/2022    EGFRIFNONA 109 09/14/2021    BCR 12.9 07/15/2022    K 3.9 07/15/2022    CO2 25.0 07/15/2022    CALCIUM 9.6 07/15/2022    ALBUMIN 5.00 07/15/2022    AST 22 07/15/2022    ALT 16 07/15/2022       WBC   Date Value Ref Range Status   07/15/2022 5.89 3.40 - 10.80 10*3/mm3 Final   05/28/2022 18.3 (H) 4.8 - 10.8 K/uL Final     RBC   Date Value Ref Range Status   07/15/2022 4.97 4.14 - 5.80 10*6/mm3 Final   05/28/2022 4.95 4.70 - 6.10 M/uL Final     Hemoglobin   Date Value Ref Range Status   07/15/2022 15.6 13.0 - 17.7 g/dL Final   05/28/2022 15.5 14.0 - 18.0 g/dL Final     Hematocrit   Date Value Ref Range Status   07/15/2022 45.8 37.5 - 51.0 % Final   05/28/2022 44.6 42.0 - 52.0 % Final     MCV   Date Value Ref Range Status   07/15/2022  92.2 79.0 - 97.0 fL Final   05/28/2022 90.1 80.0 - 94.0 fL Final     MCH   Date Value Ref Range Status   07/15/2022 31.4 26.6 - 33.0 pg Final   05/28/2022 31.3 (H) 27.0 - 31.0 pg Final     MCHC   Date Value Ref Range Status   07/15/2022 34.1 31.5 - 35.7 g/dL Final   05/28/2022 34.8 33.0 - 37.0 g/dL Final     RDW   Date Value Ref Range Status   07/15/2022 12.1 (L) 12.3 - 15.4 % Final   05/28/2022 11.9 11.5 - 14.5 % Final     RDW-SD   Date Value Ref Range Status   07/15/2022 40.9 37.0 - 54.0 fl Final     MPV   Date Value Ref Range Status   07/15/2022 11.4 6.0 - 12.0 fL Final   05/28/2022 11.3 9.4 - 12.4 fL Final     Platelets   Date Value Ref Range Status   07/15/2022 185 140 - 450 10*3/mm3 Final   05/28/2022 244 130 - 400 K/uL Final     Neutrophil Rel %   Date Value Ref Range Status   05/28/2022 86.8 (H) 50.0 - 65.0 % Final     Lymphocyte Rel %   Date Value Ref Range Status   05/28/2022 6.5 (L) 20.0 - 40.0 % Final     Monocyte Rel %   Date Value Ref Range Status   05/28/2022 6.1 0.0 - 10.0 % Final     Eosinophil Rel %   Date Value Ref Range Status   05/28/2022 0 0.0 - 5.0 % Final     Basophil Rel %   Date Value Ref Range Status   05/28/2022 0.1 0.0 - 1.0 % Final     Immature Grans %   Date Value Ref Range Status   03/03/2022 0.2 0.0 - 0.5 % Final     Neutrophils Absolute   Date Value Ref Range Status   05/28/2022 15.9 (H) 1.5 - 7.5 K/uL Final     Lymphocytes Absolute   Date Value Ref Range Status   05/28/2022 1.2 1.1 - 4.5 K/uL Final     Monocytes Absolute   Date Value Ref Range Status   05/28/2022 1.10 (H) 0.00 - 0.90 K/uL Final     Eosinophils Absolute   Date Value Ref Range Status   05/28/2022 0.00 0.00 - 0.60 K/uL Final     Basophils Absolute   Date Value Ref Range Status   05/28/2022 0.00 0.00 - 0.20 K/uL Final     Immature Grans, Absolute   Date Value Ref Range Status   05/28/2022 0.1 K/uL K/uL Final     nRBC   Date Value Ref Range Status   03/03/2022 0.0 0.0 - 0.2 /100 WBC Final         OBJECTIVE:  Visit  "Vitals  /80 (BP Location: Left arm, Patient Position: Sitting, Cuff Size: Adult)   Pulse 107   Temp 98.2 °F (36.8 °C) (Oral)   Resp 15   Ht 180.3 cm (70.98\")   Wt 81.6 kg (180 lb)   SpO2 99%   BMI 25.12 kg/m²      Physical Exam  Vitals and nursing note reviewed.   Constitutional:       General: He is not in acute distress.     Appearance: He is well-developed. He is not ill-appearing or toxic-appearing.      Comments: Pleasant   HENT:      Head: Normocephalic and atraumatic.   Eyes:      Pupils: Pupils are equal, round, and reactive to light.   Neck:      Thyroid: No thyromegaly.      Trachea: Phonation normal.   Pulmonary:      Effort: No respiratory distress.   Skin:     Coloration: Skin is not pale.      Findings: No erythema.   Neurological:      Mental Status: He is alert.   Psychiatric:         Behavior: Behavior normal.         Thought Content: Thought content normal.         Judgment: Judgment normal.         Assessment/Plan    Diagnoses and all orders for this visit:    1. Gender dysphoria (Primary)  -     spironolactone (Aldactone) 25 MG tablet; Take 1 tablet by mouth Daily.  Dispense: 30 tablet; Refill: 1  -     Basic metabolic panel; Future  -     Estradiol Valerate 10 MG/ML oil; Inject 5 mg into the appropriate muscle as directed by prescriber Every 14 (Fourteen) Days.  Dispense: 10 mL; Refill: 2    2. Need for hepatitis C screening test  -     Hepatitis C Antibody; Future    3. High risk homosexual behavior  -     HIV-1 & HIV-2 Antibodies; Future  -     Hepatitis C Antibody; Future  -     Chlamydia trachomatis, Neisseria gonorrhoeae, PCR - , Urine, Clean Catch; Future  -     HSV 1 and 2-Specific Ab, IgG; Future  -     RPR; Future      Start IM estradiol and low-dose Aldactone.  Recheck BMP in 2 weeks.  We will need to measure serum testosterone and estradiol every 3 months.  Testosterone should be less than 50, and estradiol should not exceed peak range for women which is around 100-200.  " Follow-up with BMP every 3 months for the first year and annually thereafter.    Will check STI labs as above.    She will be moving to [facility] in the next month or so.  For a short time we can follow with blood work while she is up there possibly through [facility] but ultimately my recommendation is to find a new physician to manage gender affirming hormone therapy.     Follow-up around 4 weeks or sooner if needed.    Return in about 4 weeks (around 9/5/2022) for Recheck.      GENOVEVA Cho MD  15:22 CDT  8/8/2022

## 2022-08-18 ENCOUNTER — TELEPHONE (OUTPATIENT)
Dept: INTERNAL MEDICINE | Facility: CLINIC | Age: 22
End: 2022-08-18

## 2022-08-18 NOTE — TELEPHONE ENCOUNTER
Caller: MICHAEL MASON     Relationship: SELF     Best call back number: [phone]    What is the best time to reach you: ANYTIME     Who are you requesting to speak with (clinical staff, provider,  specific staff member): CLINICAL     Do you know the name of the person who called: CLINICAL     What was the call regarding: STATES HE HAS QUESTIONS ABOUT A MEDICATION TO REPLACE THE    spironolactone         Do you require a callback: YES

## 2022-08-19 NOTE — TELEPHONE ENCOUNTER
PATIENT HAS BEEN CALLED, HE IS ASKING IF  CYPROTERONE ACITATE  COULD TAKE THE PLACE OF SPRIONOLACTONE?   PLEASE ADVISE.

## 2022-08-22 ENCOUNTER — TELEPHONE (OUTPATIENT)
Dept: INTERNAL MEDICINE | Facility: CLINIC | Age: 22
End: 2022-08-22

## 2022-08-22 NOTE — TELEPHONE ENCOUNTER
Patient called here and spoke with Jaime. Patient was upset that Jaime called him sir.I told patient I didn't know anything about his call . Patient begin to tell me he was transitioning from male to female. He stated he was doing ERT TX and     wanting Jaime and my staff to call him as a girl. Patient wanted to know how to change his name legally. I told patient I didn't know for sure but maybe through the court system. Patient became rude and said I know you are trying to be nice but,because I don't have a vagina doesn't mean I am not a female . I do not know why he said that but,then hung up

## 2022-08-23 ENCOUNTER — LAB (OUTPATIENT)
Dept: LAB | Facility: HOSPITAL | Age: 22
End: 2022-08-23

## 2022-08-23 DIAGNOSIS — F64.9 GENDER DYSPHORIA: ICD-10-CM

## 2022-08-23 DIAGNOSIS — Z11.59 NEED FOR HEPATITIS C SCREENING TEST: ICD-10-CM

## 2022-08-23 DIAGNOSIS — Z72.52 HIGH RISK HOMOSEXUAL BEHAVIOR: ICD-10-CM

## 2022-08-23 LAB
ANION GAP SERPL CALCULATED.3IONS-SCNC: 12 MMOL/L (ref 5–15)
BUN SERPL-MCNC: 18 MG/DL (ref 6–20)
BUN/CREAT SERPL: 21.2 (ref 7–25)
CALCIUM SPEC-SCNC: 9.5 MG/DL (ref 8.6–10.5)
CHLORIDE SERPL-SCNC: 104 MMOL/L (ref 98–107)
CO2 SERPL-SCNC: 25 MMOL/L (ref 22–29)
CREAT SERPL-MCNC: 0.85 MG/DL (ref 0.76–1.27)
EGFRCR SERPLBLD CKD-EPI 2021: 126 ML/MIN/1.73
GLUCOSE SERPL-MCNC: 97 MG/DL (ref 65–99)
HCV AB SER DONR QL: NORMAL
HIV1+2 AB SER QL: NORMAL
POTASSIUM SERPL-SCNC: 4.1 MMOL/L (ref 3.5–5.2)
SODIUM SERPL-SCNC: 141 MMOL/L (ref 136–145)

## 2022-08-23 PROCEDURE — 86592 SYPHILIS TEST NON-TREP QUAL: CPT

## 2022-08-23 PROCEDURE — 87591 N.GONORRHOEAE DNA AMP PROB: CPT

## 2022-08-23 PROCEDURE — 87491 CHLMYD TRACH DNA AMP PROBE: CPT

## 2022-08-23 PROCEDURE — 86695 HERPES SIMPLEX TYPE 1 TEST: CPT

## 2022-08-23 PROCEDURE — G0432 EIA HIV-1/HIV-2 SCREEN: HCPCS

## 2022-08-23 PROCEDURE — 86803 HEPATITIS C AB TEST: CPT

## 2022-08-23 PROCEDURE — 86696 HERPES SIMPLEX TYPE 2 TEST: CPT

## 2022-08-23 PROCEDURE — 36415 COLL VENOUS BLD VENIPUNCTURE: CPT

## 2022-08-23 PROCEDURE — 80048 BASIC METABOLIC PNL TOTAL CA: CPT

## 2022-08-24 LAB
C TRACH RRNA CVX QL NAA+PROBE: NOT DETECTED
HSV1 IGG SER IA-ACNC: 9.35 INDEX (ref 0–0.9)
HSV2 IGG SER IA-ACNC: <0.91 INDEX (ref 0–0.9)
N GONORRHOEA RRNA SPEC QL NAA+PROBE: NOT DETECTED
RPR SER QL: NORMAL

## 2022-08-26 NOTE — PROGRESS NOTES
Please let patient know it is not curable, but can treat outbreaks, or if frequent outbreaks can take daily medication.  Outbreak would be painful blisters that usually recur in the same place, or sometimes it is just painful tingling feeling recurring in the same place on the skin.

## 2022-08-27 ENCOUNTER — NURSE TRIAGE (OUTPATIENT)
Dept: CALL CENTER | Facility: HOSPITAL | Age: 22
End: 2022-08-27

## 2022-08-27 ENCOUNTER — DOCUMENTATION (OUTPATIENT)
Dept: INTERNAL MEDICINE | Facility: CLINIC | Age: 22
End: 2022-08-27

## 2022-08-27 RX ORDER — VALACYCLOVIR HYDROCHLORIDE 500 MG/1
500 TABLET, FILM COATED ORAL 2 TIMES DAILY
Qty: 6 TABLET | Refills: 3 | Status: SHIPPED | OUTPATIENT
Start: 2022-08-27

## 2022-08-27 NOTE — TELEPHONE ENCOUNTER
"Missed called from provider, from [facility], was concerning medication has been sent to his pharmacy. Given the information    Reason for Disposition  • Health Information question, no triage required and triager able to answer question    Additional Information  • Negative: [1] Caller is not with the adult (patient) AND [2] reporting urgent symptoms  • Negative: Lab result questions  • Negative: Medication questions  • Negative: Caller can't be reached by phone  • Negative: Caller has already spoken to PCP or another triager  • Negative: RN needs further essential information from caller in order to complete triage  • Negative: Requesting regular office appointment  • Negative: [1] Caller requesting NON-URGENT health information AND [2] PCP's office is the best resource    Answer Assessment - Initial Assessment Questions  1. REASON FOR CALL or QUESTION: \"What is your reason for calling today?\" or \"How can I best help you?\" or \"What question do you have that I can help answer?\"      Missed call    Protocols used: INFORMATION ONLY CALL - NO TRIAGE-ADULT-      "

## 2022-08-27 NOTE — PROGRESS NOTES
Patient called nurse line on Saturday requesting medication for herpes outbreak, reviewed labs that confirm HSV.  Rx sent. ABDON

## 2022-08-27 NOTE — TELEPHONE ENCOUNTER
"Recently diagnosed with HSV and is having an outbreak. Requesting medication for this. .    Reason for Disposition  • Prescription request for new medicine (not a refill)    Additional Information  • Negative: New-onset or worsening symptoms, see that guideline  (e.g., diarrhea, runny nose, sore throat)  • Negative: Medicine question not related to refill or renewal  • Negative: Caller requesting information unrelated to medicine  • Negative: [1] Prescription refill request for ESSENTIAL medicine (i.e., likelihood of harm to patient if not taken) AND [2] triager unable to refill per department policy  • Negative: [1] Prescription not at pharmacy AND [2] was prescribed by PCP recently (Exception: triager has access to EMR and prescription is recorded there. Go to Home Care and confirm for pharmacy.)  • Negative: [1] Pharmacy calling with prescription questions AND [2] triager unable to answer question    Answer Assessment - Initial Assessment Questions  1. DRUG NAME: \"What medicine do you need to have refilled?\"      UNSURE  2. REFILLS REMAINING: \"How many refills are remaining?\" (Note: The label on the medicine or pill bottle will show how many refills are remaining. If there are no refills remaining, then a renewal may be needed.)      NA  3. EXPIRATION DATE: \"What is the expiration date?\" (Note: The label states when the prescription will , and thus can no longer be refilled.)      NA  4. PRESCRIBING HCP: \"Who prescribed it?\" Reason: If prescribed by specialist, call should be referred to that group.      NEEL  5. SYMPTOMS: \"Do you have any symptoms?\"      YES. HAVING AN OUTBREAK.   6. PREGNANCY: \"Is there any chance that you are pregnant?\" \"When was your last menstrual period?\"      NO    Protocols used: MEDICATION REFILL AND RENEWAL CALL-ADULT-    "

## 2022-08-30 NOTE — TELEPHONE ENCOUNTER
PATIENT LEFT , STATED THAT THE MEDICATION WAS WORKING AND IF THERE WAS ANYTHING ELSE THEN IT WAS OK TO LEAVE IT ON THE VM.   PATIENT STATED THAT WHEN HE RECEIVED THE CALL EARLIER PATIENT WAS AT WORK.

## 2022-09-07 ENCOUNTER — OFFICE VISIT (OUTPATIENT)
Dept: INTERNAL MEDICINE | Facility: CLINIC | Age: 22
End: 2022-09-07

## 2022-09-07 VITALS
WEIGHT: 182 LBS | HEART RATE: 118 BPM | TEMPERATURE: 98.2 F | HEIGHT: 71 IN | OXYGEN SATURATION: 98 % | RESPIRATION RATE: 15 BRPM | SYSTOLIC BLOOD PRESSURE: 122 MMHG | DIASTOLIC BLOOD PRESSURE: 80 MMHG | BODY MASS INDEX: 25.48 KG/M2

## 2022-09-07 DIAGNOSIS — Z79.899 HIGH RISK MEDICATION USE: ICD-10-CM

## 2022-09-07 DIAGNOSIS — F64.9 GENDER DYSPHORIA: Primary | ICD-10-CM

## 2022-09-07 PROCEDURE — 99214 OFFICE O/P EST MOD 30 MIN: CPT | Performed by: INTERNAL MEDICINE

## 2022-09-07 RX ORDER — ESTRADIOL VALERATE 20 MG/ML
0.5 INJECTION INTRAMUSCULAR
COMMUNITY
Start: 2022-08-09 | End: 2022-10-09 | Stop reason: SDUPTHER

## 2022-09-07 RX ORDER — SPIRONOLACTONE 50 MG/1
50 TABLET, FILM COATED ORAL DAILY
Qty: 30 TABLET | Refills: 2 | Status: SHIPPED | OUTPATIENT
Start: 2022-09-07 | End: 2022-10-09 | Stop reason: SDUPTHER

## 2022-09-07 NOTE — PROGRESS NOTES
Chief Complaint   Patient presents with   • Gender Dysphoria    • Follow-up         History:  Krishna Jean-Baptiste is a 21 y.o. male who presents today for evaluation of the above problems.      She presents today for follow-up. She will be moving to [facility] in the couple months or so.      She started hormone therapy around August 10.  She is also on Aldactone 25 mg daily and recent blood work on August 23 showed normal potassium and renal function at 4.1 and 0.85 respectively    She has already noticed results in the form of breast enlargement, softer skin and lighter hair.  Voice still seems to be about the same as is facial structure.  There has also been increasing mood swings    HPI      ROS:  Review of Systems  As above    Current Outpatient Medications:   •  albuterol sulfate  (90 Base) MCG/ACT inhaler, Inhale 2 puffs Every 6 (Six) Hours As Needed for Wheezing or Shortness of Air., Disp: 6.7 g, Rfl: 0  •  Dextromethorphan HBr (Vicks DayQuil Cough) 15 MG/15ML liquid, Take  by mouth., Disp: , Rfl:   •  estradiol valerate (DELESTROGEN) 20 MG/ML injection, Inject 0.5 mL into the appropriate muscle as directed by prescriber Every 14 (Fourteen) Days., Disp: , Rfl:   •  spironolactone (Aldactone) 50 MG tablet, Take 1 tablet by mouth Daily., Disp: 30 tablet, Rfl: 2  •  valACYclovir (Valtrex) 500 MG tablet, Take 1 tablet by mouth 2 (Two) Times a Day. Take twice daily for 3 days when outbreak occurs. Begin as soon as possible when symptoms appear., Disp: 6 tablet, Rfl: 3    Lab Results   Component Value Date    GLUCOSE 97 08/23/2022    BUN 18 08/23/2022    CREATININE 0.85 08/23/2022    EGFRIFNONA 109 09/14/2021    BCR 21.2 08/23/2022    K 4.1 08/23/2022    CO2 25.0 08/23/2022    CALCIUM 9.5 08/23/2022    ALBUMIN 5.00 07/15/2022    AST 22 07/15/2022    ALT 16 07/15/2022       WBC   Date Value Ref Range Status   07/15/2022 5.89 3.40 - 10.80 10*3/mm3 Final   05/28/2022 18.3 (H) 4.8 - 10.8 K/uL Final     RBC    Date Value Ref Range Status   07/15/2022 4.97 4.14 - 5.80 10*6/mm3 Final   05/28/2022 4.95 4.70 - 6.10 M/uL Final     Hemoglobin   Date Value Ref Range Status   07/15/2022 15.6 13.0 - 17.7 g/dL Final   05/28/2022 15.5 14.0 - 18.0 g/dL Final     Hematocrit   Date Value Ref Range Status   07/15/2022 45.8 37.5 - 51.0 % Final   05/28/2022 44.6 42.0 - 52.0 % Final     MCV   Date Value Ref Range Status   07/15/2022 92.2 79.0 - 97.0 fL Final   05/28/2022 90.1 80.0 - 94.0 fL Final     MCH   Date Value Ref Range Status   07/15/2022 31.4 26.6 - 33.0 pg Final   05/28/2022 31.3 (H) 27.0 - 31.0 pg Final     MCHC   Date Value Ref Range Status   07/15/2022 34.1 31.5 - 35.7 g/dL Final   05/28/2022 34.8 33.0 - 37.0 g/dL Final     RDW   Date Value Ref Range Status   07/15/2022 12.1 (L) 12.3 - 15.4 % Final   05/28/2022 11.9 11.5 - 14.5 % Final     RDW-SD   Date Value Ref Range Status   07/15/2022 40.9 37.0 - 54.0 fl Final     MPV   Date Value Ref Range Status   07/15/2022 11.4 6.0 - 12.0 fL Final   05/28/2022 11.3 9.4 - 12.4 fL Final     Platelets   Date Value Ref Range Status   07/15/2022 185 140 - 450 10*3/mm3 Final   05/28/2022 244 130 - 400 K/uL Final     Neutrophil Rel %   Date Value Ref Range Status   05/28/2022 86.8 (H) 50.0 - 65.0 % Final     Lymphocyte Rel %   Date Value Ref Range Status   05/28/2022 6.5 (L) 20.0 - 40.0 % Final     Monocyte Rel %   Date Value Ref Range Status   05/28/2022 6.1 0.0 - 10.0 % Final     Eosinophil Rel %   Date Value Ref Range Status   05/28/2022 0 0.0 - 5.0 % Final     Basophil Rel %   Date Value Ref Range Status   05/28/2022 0.1 0.0 - 1.0 % Final     Immature Grans %   Date Value Ref Range Status   03/03/2022 0.2 0.0 - 0.5 % Final     Neutrophils Absolute   Date Value Ref Range Status   05/28/2022 15.9 (H) 1.5 - 7.5 K/uL Final     Lymphocytes Absolute   Date Value Ref Range Status   05/28/2022 1.2 1.1 - 4.5 K/uL Final     Monocytes Absolute   Date Value Ref Range Status   05/28/2022 1.10 (H)  "0.00 - 0.90 K/uL Final     Eosinophils Absolute   Date Value Ref Range Status   05/28/2022 0.00 0.00 - 0.60 K/uL Final     Basophils Absolute   Date Value Ref Range Status   05/28/2022 0.00 0.00 - 0.20 K/uL Final     Immature Grans, Absolute   Date Value Ref Range Status   05/28/2022 0.1 K/uL K/uL Final     nRBC   Date Value Ref Range Status   03/03/2022 0.0 0.0 - 0.2 /100 WBC Final         OBJECTIVE:  Visit Vitals  /80 (BP Location: Left arm, Patient Position: Sitting, Cuff Size: Adult)   Pulse 118   Temp 98.2 °F (36.8 °C) (Oral)   Resp 15   Ht 180.3 cm (70.98\")   Wt 82.6 kg (182 lb)   SpO2 98%   BMI 25.40 kg/m²      Physical Exam  Vitals and nursing note reviewed.   Constitutional:       General: He is not in acute distress.     Appearance: He is well-developed. He is not ill-appearing or toxic-appearing.      Comments: Pleasant   HENT:      Head: Normocephalic and atraumatic.   Eyes:      Pupils: Pupils are equal, round, and reactive to light.   Neck:      Thyroid: No thyromegaly.      Trachea: Phonation normal.   Pulmonary:      Effort: No respiratory distress.   Skin:     Coloration: Skin is not pale.      Findings: No erythema.   Neurological:      Mental Status: He is alert.   Psychiatric:         Behavior: Behavior normal.         Thought Content: Thought content normal.         Judgment: Judgment normal.         Assessment/Plan    Diagnoses and all orders for this visit:    1. Gender dysphoria (Primary)  -     spironolactone (Aldactone) 50 MG tablet; Take 1 tablet by mouth Daily.  Dispense: 30 tablet; Refill: 2  -     Basic metabolic panel; Future  -     Testosterone; Future  -     Estradiol; Future    2. High risk medication use      Increase Aldactone to 50 mg daily.  Recheck labs in about 6 to 8 weeks, and make adjustments medications based on those results.      We will need to measure serum testosterone and estradiol every 3 months.  Testosterone should be less than 50, and estradiol should not " exceed peak range for women which is around 100-200.  Follow-up with BMP every 3 months for the first year and annually thereafter.      She will be moving to [facility] in the next couple months.  For a short time we can follow with blood work while she is up there possibly through [facility] but ultimately my recommendation is to find a new physician to manage gender affirming hormone therapy.     Follow-up as needed.    No follow-ups on file.      GENOVEVA Cho MD  15:22 CDT  9/7/2022

## 2022-10-04 ENCOUNTER — LAB (OUTPATIENT)
Dept: LAB | Facility: HOSPITAL | Age: 22
End: 2022-10-04

## 2022-10-04 DIAGNOSIS — F64.9 GENDER DYSPHORIA: ICD-10-CM

## 2022-10-04 LAB
ANION GAP SERPL CALCULATED.3IONS-SCNC: 10 MMOL/L (ref 5–15)
BUN SERPL-MCNC: 17 MG/DL (ref 6–20)
BUN/CREAT SERPL: 15.6 (ref 7–25)
CALCIUM SPEC-SCNC: 9.2 MG/DL (ref 8.6–10.5)
CHLORIDE SERPL-SCNC: 102 MMOL/L (ref 98–107)
CO2 SERPL-SCNC: 25 MMOL/L (ref 22–29)
CREAT SERPL-MCNC: 1.09 MG/DL (ref 0.76–1.27)
EGFRCR SERPLBLD CKD-EPI 2021: 98.4 ML/MIN/1.73
GLUCOSE SERPL-MCNC: 107 MG/DL (ref 65–99)
POTASSIUM SERPL-SCNC: 3.7 MMOL/L (ref 3.5–5.2)
SODIUM SERPL-SCNC: 137 MMOL/L (ref 136–145)

## 2022-10-04 PROCEDURE — 36415 COLL VENOUS BLD VENIPUNCTURE: CPT

## 2022-10-04 PROCEDURE — 84403 ASSAY OF TOTAL TESTOSTERONE: CPT

## 2022-10-04 PROCEDURE — 82670 ASSAY OF TOTAL ESTRADIOL: CPT

## 2022-10-04 PROCEDURE — 80048 BASIC METABOLIC PNL TOTAL CA: CPT

## 2022-10-05 LAB
ESTRADIOL SERPL HS-MCNC: 78.1 PG/ML
TESTOST SERPL-MCNC: 24.8 NG/DL (ref 249–836)

## 2022-10-07 ENCOUNTER — TELEPHONE (OUTPATIENT)
Dept: INTERNAL MEDICINE | Facility: CLINIC | Age: 22
End: 2022-10-07

## 2022-10-07 NOTE — TELEPHONE ENCOUNTER
Caller: Krishna Jean-Baptiste    Relationship to patient: Self    Best call back number: [phone]    Patient is needing: pt is asking for a call w/ lab results from 10/4.

## 2022-10-07 NOTE — TELEPHONE ENCOUNTER
Will you please let him know his basic labs look good, his estrogen is higher, and his testosterone is low. I know this is what we are looking for with his current therapy.  I just want to leave it in the box for Dr. Cho to review next week to see if there are any changes. Thanks

## 2022-10-07 NOTE — TELEPHONE ENCOUNTER
PATIENT HAS RETURNED CALL GIVEN MESSAGE AND UNDERSTANDING VOICED.  WILL FORWARD TO DR HOGANCAMPS BOX

## 2022-10-09 DIAGNOSIS — F64.9 GENDER DYSPHORIA: Primary | ICD-10-CM

## 2022-10-09 DIAGNOSIS — Z79.899 HIGH RISK MEDICATION USE: ICD-10-CM

## 2022-10-09 RX ORDER — ESTRADIOL VALERATE 20 MG/ML
20 INJECTION INTRAMUSCULAR
Qty: 5 ML | Refills: 2 | Status: SHIPPED | OUTPATIENT
Start: 2022-10-09

## 2022-10-09 RX ORDER — SPIRONOLACTONE 100 MG/1
100 TABLET, FILM COATED ORAL DAILY
Qty: 30 TABLET | Refills: 2 | Status: SHIPPED | OUTPATIENT
Start: 2022-10-09 | End: 2023-01-03 | Stop reason: SDUPTHER

## 2022-10-12 NOTE — TELEPHONE ENCOUNTER
Caller: Krishna Jean-Baptiste    Relationship: Self    Best call back number: [phone]    What test was performed: LABS    When was the test performed:  10/4/22    Where was the test performed:  [facility]    Additional notes:  THE PATIENT STATES THAT HE WOULD LIKE A PHONE CALL BACK ABOUT HIS LABS FROM 10/4/22.

## 2022-11-15 ENCOUNTER — LAB (OUTPATIENT)
Dept: LAB | Facility: HOSPITAL | Age: 22
End: 2022-11-15

## 2022-11-15 DIAGNOSIS — Z79.899 HIGH RISK MEDICATION USE: ICD-10-CM

## 2022-11-15 DIAGNOSIS — F64.9 GENDER DYSPHORIA: ICD-10-CM

## 2022-11-15 LAB
ANION GAP SERPL CALCULATED.3IONS-SCNC: 11 MMOL/L (ref 5–15)
BUN SERPL-MCNC: 21 MG/DL (ref 6–20)
BUN/CREAT SERPL: 22.3 (ref 7–25)
CALCIUM SPEC-SCNC: 9.2 MG/DL (ref 8.6–10.5)
CHLORIDE SERPL-SCNC: 102 MMOL/L (ref 98–107)
CO2 SERPL-SCNC: 25 MMOL/L (ref 22–29)
CREAT SERPL-MCNC: 0.94 MG/DL (ref 0.76–1.27)
EGFRCR SERPLBLD CKD-EPI 2021: 116.8 ML/MIN/1.73
GLUCOSE SERPL-MCNC: 126 MG/DL (ref 65–99)
POTASSIUM SERPL-SCNC: 3.5 MMOL/L (ref 3.5–5.2)
SODIUM SERPL-SCNC: 138 MMOL/L (ref 136–145)

## 2022-11-15 PROCEDURE — 80048 BASIC METABOLIC PNL TOTAL CA: CPT

## 2022-11-15 PROCEDURE — 36415 COLL VENOUS BLD VENIPUNCTURE: CPT

## 2022-11-15 PROCEDURE — 84403 ASSAY OF TOTAL TESTOSTERONE: CPT

## 2022-11-15 PROCEDURE — 82670 ASSAY OF TOTAL ESTRADIOL: CPT

## 2022-11-16 LAB
ESTRADIOL SERPL HS-MCNC: 152 PG/ML
TESTOST SERPL-MCNC: 26.8 NG/DL (ref 249–836)

## 2022-12-19 ENCOUNTER — TELEPHONE (OUTPATIENT)
Dept: INTERNAL MEDICINE | Facility: CLINIC | Age: 22
End: 2022-12-19

## 2022-12-23 ENCOUNTER — HOSPITAL ENCOUNTER (EMERGENCY)
Age: 22
Discharge: HOME | End: 2022-12-23
Payer: COMMERCIAL

## 2022-12-23 ENCOUNTER — APPOINTMENT (OUTPATIENT)
Dept: CT IMAGING | Age: 22
End: 2022-12-23
Payer: COMMERCIAL

## 2022-12-23 VITALS
DIASTOLIC BLOOD PRESSURE: 77 MMHG | SYSTOLIC BLOOD PRESSURE: 109 MMHG | HEIGHT: 71 IN | HEART RATE: 84 BPM | WEIGHT: 171 LBS | RESPIRATION RATE: 18 BRPM | OXYGEN SATURATION: 95 % | BODY MASS INDEX: 23.94 KG/M2 | TEMPERATURE: 98.5 F
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
## 2022-12-23 DIAGNOSIS — S00.81XA ABRASION OF FOREHEAD, INITIAL ENCOUNTER: ICD-10-CM

## 2022-12-23 PROCEDURE — 70450 CT HEAD/BRAIN W/O DYE: CPT

## 2022-12-23 PROCEDURE — 99284 EMERGENCY DEPT VISIT MOD MDM: CPT

## 2022-12-23 PROCEDURE — 6370000000 HC RX 637 (ALT 250 FOR IP): Performed by: PHYSICIAN ASSISTANT

## 2022-12-23 PROCEDURE — 72125 CT NECK SPINE W/O DYE: CPT

## 2022-12-23 RX ORDER — SPIRONOLACTONE 100 MG/1
100 TABLET, FILM COATED ORAL DAILY
COMMUNITY

## 2022-12-23 RX ORDER — LIDOCAINE 50 MG/G
1 PATCH TOPICAL DAILY
Qty: 30 PATCH | Refills: 0 | Status: SHIPPED | OUTPATIENT
Start: 2022-12-23

## 2022-12-23 RX ORDER — ACETAMINOPHEN 500 MG
1000 TABLET ORAL
Status: COMPLETED | OUTPATIENT
Start: 2022-12-23 | End: 2022-12-23

## 2022-12-23 RX ORDER — NAPROXEN 500 MG/1
500 TABLET ORAL 2 TIMES DAILY WITH MEALS
Qty: 12 TABLET | Refills: 0 | Status: SHIPPED | OUTPATIENT
Start: 2022-12-23

## 2022-12-23 RX ORDER — TIZANIDINE 4 MG/1
4 TABLET ORAL EVERY 6 HOURS PRN
Qty: 6 TABLET | Refills: 0 | Status: SHIPPED | OUTPATIENT
Start: 2022-12-23

## 2022-12-23 RX ORDER — VALACYCLOVIR HYDROCHLORIDE 500 MG/1
500 TABLET, FILM COATED ORAL 2 TIMES DAILY
COMMUNITY

## 2022-12-23 RX ORDER — IBUPROFEN 400 MG/1
800 TABLET ORAL ONCE
Status: COMPLETED | OUTPATIENT
Start: 2022-12-23 | End: 2022-12-23

## 2022-12-23 RX ADMIN — ACETAMINOPHEN 1000 MG: 500 TABLET ORAL at 23:35

## 2022-12-23 RX ADMIN — IBUPROFEN 800 MG: 400 TABLET, FILM COATED ORAL at 23:35

## 2022-12-23 ASSESSMENT — ENCOUNTER SYMPTOMS
EYES NEGATIVE: 1
SHORTNESS OF BREATH: 0
CHEST TIGHTNESS: 0
RESPIRATORY NEGATIVE: 1
NAUSEA: 0
FACIAL SWELLING: 0
GASTROINTESTINAL NEGATIVE: 1
PHOTOPHOBIA: 0
VOMITING: 0
BACK PAIN: 0

## 2022-12-23 ASSESSMENT — PAIN DESCRIPTION - LOCATION: LOCATION: HEAD

## 2022-12-23 ASSESSMENT — PAIN DESCRIPTION - FREQUENCY: FREQUENCY: CONTINUOUS

## 2022-12-23 ASSESSMENT — PAIN - FUNCTIONAL ASSESSMENT: PAIN_FUNCTIONAL_ASSESSMENT: 0-10

## 2022-12-23 ASSESSMENT — PAIN SCALES - GENERAL: PAINLEVEL_OUTOF10: 4

## 2022-12-23 ASSESSMENT — PAIN DESCRIPTION - DESCRIPTORS: DESCRIPTORS: ACHING

## 2022-12-23 NOTE — Clinical Note
Shellie Alexander was seen and treated in our emergency department on 12/23/2022. He may return to work on 12/27/2022. If you have any questions or concerns, please don't hesitate to call.       Keli Lakhani PA-C

## 2022-12-24 NOTE — DISCHARGE INSTRUCTIONS
It is likely that you will be more sore tomorrow from a muscular standpoint due to the mechanism of your injury. Please apply heat to your neck and back followed by gentle range of motion stretching. Please apply ice to your forehead. Please continue to use Biofreeze and lidocaine topically, muscle relaxers, and the naproxen or your choice of over-the-counter anti-inflammatories. Please follow-up with your primary care provider within the next 48 hours for reevaluation. Should she develop any new or worsening symptoms please return to the ER for further evaluation.
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
## 2023-01-03 ENCOUNTER — OFFICE VISIT (OUTPATIENT)
Dept: INTERNAL MEDICINE | Facility: CLINIC | Age: 23
End: 2023-01-03
Payer: MEDICAID

## 2023-01-03 VITALS
WEIGHT: 167 LBS | HEART RATE: 104 BPM | RESPIRATION RATE: 15 BRPM | OXYGEN SATURATION: 99 % | DIASTOLIC BLOOD PRESSURE: 70 MMHG | BODY MASS INDEX: 23.38 KG/M2 | HEIGHT: 71 IN | SYSTOLIC BLOOD PRESSURE: 115 MMHG

## 2023-01-03 DIAGNOSIS — K62.5 BRBPR (BRIGHT RED BLOOD PER RECTUM): ICD-10-CM

## 2023-01-03 DIAGNOSIS — F64.9 GENDER DYSPHORIA: Primary | ICD-10-CM

## 2023-01-03 DIAGNOSIS — F32.A ANXIETY AND DEPRESSION: ICD-10-CM

## 2023-01-03 DIAGNOSIS — Z79.899 HIGH RISK MEDICATION USE: ICD-10-CM

## 2023-01-03 DIAGNOSIS — F41.9 ANXIETY AND DEPRESSION: ICD-10-CM

## 2023-01-03 DIAGNOSIS — L98.9 SCALP LESION: ICD-10-CM

## 2023-01-03 PROCEDURE — 1159F MED LIST DOCD IN RCRD: CPT | Performed by: INTERNAL MEDICINE

## 2023-01-03 PROCEDURE — 99214 OFFICE O/P EST MOD 30 MIN: CPT | Performed by: INTERNAL MEDICINE

## 2023-01-03 PROCEDURE — 1160F RVW MEDS BY RX/DR IN RCRD: CPT | Performed by: INTERNAL MEDICINE

## 2023-01-03 RX ORDER — SPIRONOLACTONE 100 MG/1
100 TABLET, FILM COATED ORAL DAILY
Qty: 30 TABLET | Refills: 5 | Status: SHIPPED | OUTPATIENT
Start: 2023-01-03

## 2023-01-03 RX ORDER — ESCITALOPRAM OXALATE 5 MG/1
TABLET ORAL
Qty: 30 TABLET | Refills: 6 | Status: SHIPPED | OUTPATIENT
Start: 2023-01-03 | End: 2023-05-03

## 2023-01-03 RX ORDER — PANTOPRAZOLE SODIUM 40 MG/1
40 TABLET, DELAYED RELEASE ORAL DAILY
Qty: 30 TABLET | Refills: 2 | Status: SHIPPED | OUTPATIENT
Start: 2023-01-03 | End: 2023-04-03

## 2023-01-03 NOTE — PATIENT INSTRUCTIONS
-Please get your labs 2 to 3 days prior to your next visit so we can discuss them during that visit.
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
## 2023-01-11 ENCOUNTER — TELEPHONE (OUTPATIENT)
Dept: INTERNAL MEDICINE | Facility: CLINIC | Age: 23
End: 2023-01-11
Payer: MEDICAID

## 2023-01-11 DIAGNOSIS — Z72.52 HIGH RISK HOMOSEXUAL BEHAVIOR: Primary | ICD-10-CM

## 2023-01-11 NOTE — TELEPHONE ENCOUNTER
PATIENT REQUESTING AN ORDER FOR ROUTINE  LABS TO CHECK FOR STDS     PLEASE CALL ONCE ORDERS ARE IN     GOOD CALL BACK   [phone]

## 2023-01-13 ENCOUNTER — TELEPHONE (OUTPATIENT)
Dept: INTERNAL MEDICINE | Facility: CLINIC | Age: 23
End: 2023-01-13
Payer: MEDICAID

## 2023-01-13 NOTE — TELEPHONE ENCOUNTER
"Pt called in and states he took his spironolactone this morning and then again just a short time ago because he accidentally forgot he took it this morning. He is just making sure he is \" going to be ok\". He Is not having and s/s. I told him if he started feeling any different than normal he should report to the ER.       "

## 2023-01-13 NOTE — TELEPHONE ENCOUNTER
Also, please have her keep an eye on her blood pressure since the Aldactone is antihypertensive medication.  I agree with going to the emergency room if side effects or symptoms start.

## 2023-01-18 ENCOUNTER — LAB (OUTPATIENT)
Dept: LAB | Facility: HOSPITAL | Age: 23
End: 2023-01-18
Payer: MEDICAID

## 2023-01-18 DIAGNOSIS — F64.9 GENDER DYSPHORIA: ICD-10-CM

## 2023-01-18 DIAGNOSIS — Z79.899 HIGH RISK MEDICATION USE: ICD-10-CM

## 2023-01-18 DIAGNOSIS — Z72.52 HIGH RISK HOMOSEXUAL BEHAVIOR: ICD-10-CM

## 2023-01-18 LAB
ALBUMIN SERPL-MCNC: 4.8 G/DL (ref 3.5–5.2)
ALBUMIN/GLOB SERPL: 2.4 G/DL
ALP SERPL-CCNC: 39 U/L (ref 39–117)
ALT SERPL W P-5'-P-CCNC: 16 U/L (ref 1–41)
ANION GAP SERPL CALCULATED.3IONS-SCNC: 8.9 MMOL/L (ref 5–15)
AST SERPL-CCNC: 16 U/L (ref 1–40)
BILIRUB SERPL-MCNC: 0.3 MG/DL (ref 0–1.2)
BUN SERPL-MCNC: 17 MG/DL (ref 6–20)
BUN/CREAT SERPL: 20.7 (ref 7–25)
CALCIUM SPEC-SCNC: 9.2 MG/DL (ref 8.6–10.5)
CHLORIDE SERPL-SCNC: 103 MMOL/L (ref 98–107)
CO2 SERPL-SCNC: 25.1 MMOL/L (ref 22–29)
CREAT SERPL-MCNC: 0.82 MG/DL (ref 0.76–1.27)
EGFRCR SERPLBLD CKD-EPI 2021: 126.6 ML/MIN/1.73
ESTRADIOL SERPL HS-MCNC: 788 PG/ML
GLOBULIN UR ELPH-MCNC: 2 GM/DL
GLUCOSE SERPL-MCNC: 81 MG/DL (ref 65–99)
HIV1+2 AB SER QL: NORMAL
POTASSIUM SERPL-SCNC: 3.9 MMOL/L (ref 3.5–5.2)
PROT SERPL-MCNC: 6.8 G/DL (ref 6–8.5)
RPR SER QL: NORMAL
SODIUM SERPL-SCNC: 137 MMOL/L (ref 136–145)
TESTOST SERPL-MCNC: 28.9 NG/DL (ref 249–836)

## 2023-01-18 PROCEDURE — 82670 ASSAY OF TOTAL ESTRADIOL: CPT

## 2023-01-18 PROCEDURE — 80053 COMPREHEN METABOLIC PANEL: CPT

## 2023-01-18 PROCEDURE — 86695 HERPES SIMPLEX TYPE 1 TEST: CPT

## 2023-01-18 PROCEDURE — 87591 N.GONORRHOEAE DNA AMP PROB: CPT

## 2023-01-18 PROCEDURE — 86592 SYPHILIS TEST NON-TREP QUAL: CPT

## 2023-01-18 PROCEDURE — 84403 ASSAY OF TOTAL TESTOSTERONE: CPT

## 2023-01-18 PROCEDURE — G0432 EIA HIV-1/HIV-2 SCREEN: HCPCS

## 2023-01-18 PROCEDURE — 86696 HERPES SIMPLEX TYPE 2 TEST: CPT

## 2023-01-18 PROCEDURE — 87491 CHLMYD TRACH DNA AMP PROBE: CPT

## 2023-01-18 PROCEDURE — 36415 COLL VENOUS BLD VENIPUNCTURE: CPT

## 2023-01-19 LAB
C TRACH RRNA CVX QL NAA+PROBE: NOT DETECTED
HSV1 IGG SER IA-ACNC: 6.86 INDEX (ref 0–0.9)
HSV2 IGG SER IA-ACNC: <0.91 INDEX (ref 0–0.9)
N GONORRHOEA RRNA SPEC QL NAA+PROBE: NOT DETECTED

## 2023-03-29 ENCOUNTER — TELEPHONE (OUTPATIENT)
Dept: FAMILY MEDICINE CLINIC | Facility: CLINIC | Age: 23
End: 2023-03-29
Payer: MEDICAID

## 2023-03-29 NOTE — TELEPHONE ENCOUNTER
Tried to call patient about his no shows no answer. Patient will be dismissed from provider due to no shows

## 2023-04-17 DIAGNOSIS — F64.9 GENDER DYSPHORIA: ICD-10-CM

## 2023-04-17 DIAGNOSIS — Z79.899 HIGH RISK MEDICATION USE: ICD-10-CM

## 2023-04-17 RX ORDER — ESTRADIOL VALERATE 20 MG/ML
INJECTION INTRAMUSCULAR
Qty: 5 ML | Refills: 2 | OUTPATIENT
Start: 2023-04-17

## 2023-05-18 ENCOUNTER — HOSPITAL ENCOUNTER (EMERGENCY)
Age: 23
Discharge: HOME | End: 2023-05-19
Attending: PEDIATRICS
Payer: MEDICAID

## 2023-05-18 DIAGNOSIS — Z18.9 RETAINED FOREIGN BODY: Primary | ICD-10-CM

## 2023-05-18 PROCEDURE — 99283 EMERGENCY DEPT VISIT LOW MDM: CPT

## 2023-05-18 RX ORDER — CITALOPRAM 10 MG/1
5 TABLET ORAL DAILY
COMMUNITY

## 2023-05-18 ASSESSMENT — PAIN - FUNCTIONAL ASSESSMENT: PAIN_FUNCTIONAL_ASSESSMENT: NONE - DENIES PAIN

## 2023-05-19 ENCOUNTER — APPOINTMENT (OUTPATIENT)
Dept: GENERAL RADIOLOGY | Age: 23
End: 2023-05-19
Payer: MEDICAID

## 2023-05-19 VITALS
TEMPERATURE: 98.3 F | BODY MASS INDEX: 22.4 KG/M2 | DIASTOLIC BLOOD PRESSURE: 71 MMHG | RESPIRATION RATE: 18 BRPM | OXYGEN SATURATION: 98 % | SYSTOLIC BLOOD PRESSURE: 106 MMHG | WEIGHT: 160 LBS | HEART RATE: 77 BPM | HEIGHT: 71 IN

## 2023-05-19 PROCEDURE — 72170 X-RAY EXAM OF PELVIS: CPT

## 2023-05-19 PROCEDURE — 6370000000 HC RX 637 (ALT 250 FOR IP): Performed by: PEDIATRICS

## 2023-05-19 RX ORDER — CEPHALEXIN 250 MG/1
500 CAPSULE ORAL ONCE
Status: COMPLETED | OUTPATIENT
Start: 2023-05-19 | End: 2023-05-19

## 2023-05-19 RX ORDER — CEPHALEXIN 500 MG/1
500 CAPSULE ORAL 2 TIMES DAILY
Qty: 14 CAPSULE | Refills: 0 | Status: SHIPPED | OUTPATIENT
Start: 2023-05-19 | End: 2023-05-26

## 2023-05-19 RX ADMIN — CEPHALEXIN 500 MG: 250 CAPSULE ORAL at 02:13

## 2023-05-19 ASSESSMENT — PAIN - FUNCTIONAL ASSESSMENT: PAIN_FUNCTIONAL_ASSESSMENT: NONE - DENIES PAIN

## 2023-05-19 NOTE — ED PROVIDER NOTES
tissues. If the index of suspicion is sufficiently high, CT could be considered in further evaluation. Electronically signed by Oswaldo Osorio MD on 05-19-23 at 0132        Attempt to visualize foreign body with bedside ultrasound without success. LABS:  Labs Reviewed - No data to display    All other labs were within normal range or not returned as of this dictation. EMERGENCY DEPARTMENT COURSE and DIFFERENTIAL DIAGNOSIS/MDM:   Vitals:    Vitals:    05/18/23 2253 05/18/23 2256 05/19/23 0210 05/19/23 0217   BP:  (!) 92/58 106/71 106/71   Pulse:  92 77 77   Resp:  18 16 18   Temp: 98.3 °F (36.8 °C)      TempSrc: Oral      SpO2:  100% 98% 98%   Weight: 160 lb (72.6 kg)      Height: 5' 11\" (1.803 m)          MDM  20year-old presents to the emergency department with foreign body sensation/tenderness in upper right buttock area. Foreign body is not visualized on x-ray, PA and lateral, of pelvis or bedside ultrasound. Patient started on Keflex 500 mg p.o. twice daily. Patient will follow-up with Dr. Bobby Fothergill, general surgeon on-call. Patient will return immediately with heat, redness, drainage, or other concerns. PROCEDURES:  Unless otherwise noted below, none     Procedures    FINAL IMPRESSION      1.  Retained foreign body          DISPOSITION/PLAN   DISPOSITION Decision To Discharge 05/19/2023 02:04:03 AM      PATIENT REFERRED TO:  Selwyn Beltran MD  [address] [place] [address] [id number]      Call later this morning for appt      DISCHARGE MEDICATIONS:  Discharge Medication List as of 5/19/2023  2:09 AM        START taking these medications    Details   cephALEXin (KEFLEX) 500 MG capsule Take 1 capsule by mouth 2 times daily for 7 days, Disp-14 capsule, R-0Normal                (Please note that portions of this note were completed with a voice recognition program.  Efforts were made to edit thedictations but occasionally words are mis-transcribed.)    Sher Arroyo

## 2023-05-19 NOTE — DISCHARGE INSTRUCTIONS
Return with redness, heat, drainage, or other concerns. Call surgical office later this morning for appointment for evaluation.

## 2023-09-11 DIAGNOSIS — Z79.899 HIGH RISK MEDICATION USE: ICD-10-CM

## 2023-09-11 DIAGNOSIS — F64.9 GENDER DYSPHORIA: ICD-10-CM

## 2023-09-11 RX ORDER — ESTRADIOL VALERATE 20 MG/ML
INJECTION INTRAMUSCULAR
Qty: 5 ML | Refills: 2 | OUTPATIENT
Start: 2023-09-11

## 2023-09-22 DIAGNOSIS — F64.9 GENDER DYSPHORIA: ICD-10-CM

## 2023-09-22 DIAGNOSIS — Z79.899 HIGH RISK MEDICATION USE: ICD-10-CM

## 2023-09-22 RX ORDER — ESTRADIOL VALERATE 20 MG/ML
INJECTION INTRAMUSCULAR
Qty: 5 ML | Refills: 2 | OUTPATIENT
Start: 2023-09-22